# Patient Record
Sex: MALE | HISPANIC OR LATINO | Employment: FULL TIME | URBAN - METROPOLITAN AREA
[De-identification: names, ages, dates, MRNs, and addresses within clinical notes are randomized per-mention and may not be internally consistent; named-entity substitution may affect disease eponyms.]

---

## 2022-05-13 ENCOUNTER — EVALUATION (OUTPATIENT)
Dept: PHYSICAL THERAPY | Facility: CLINIC | Age: 37
End: 2022-05-13
Payer: COMMERCIAL

## 2022-05-13 DIAGNOSIS — M23.41 LOOSE BODY IN KNEE, RIGHT KNEE: Primary | ICD-10-CM

## 2022-05-13 DIAGNOSIS — M25.561 RIGHT KNEE PAIN, UNSPECIFIED CHRONICITY: ICD-10-CM

## 2022-05-13 PROCEDURE — 97161 PT EVAL LOW COMPLEX 20 MIN: CPT

## 2022-05-13 NOTE — PROGRESS NOTES
PT Evaluation     Today's date: 2022  Patient name: Rolando Leal  : 1985  MRN: [id number]  Referring provider: Che Mota  Dx:   Encounter Diagnosis     ICD-10-CM    1  Loose body in knee, right knee  M23 41    2  Right knee pain, unspecified chronicity  M25 561        Start Time: 1102  Stop Time: 1145  Total time in clinic (min): 43 minutes    Assessment  Assessment details: Pt is a pleasant 43 y o  male who presents to [place] [address] PT with one month s/p loose body removal in R knee  Today, he presents with decreased and painful R knee ROM and joint mobility, decreased B LE and core strength, high self reports of pain, and decreased tolerance to activity  Functionally, he is limited in his ability to stand and ambulate, negotiate stairs, perform functional transfers, and participate in age appropriate recreation and exercise  He ism motivated to improve  Pt will benefit from skilled PT to address the aforementioned deficits and limitations in an effort to maximize pain free functional mobility and overall quality of life  Progress as able with these goals in mind  Impairments: abnormal gait, abnormal muscle firing, abnormal muscle tone, abnormal or restricted ROM, abnormal movement, activity intolerance, impaired physical strength, lacks appropriate home exercise program and pain with function  Understanding of Dx/Px/POC: good   Prognosis: good    Goals  Short term goals (3 weeks):  1) Pt will improve R knee AROM to 0-125 pain free  2) Pt will improve B LE and core strength deficits by 1/3 grade MMT  3) Pt will reports pain at worse <4/10  4) Pt will initiate and progress HEP w/ special emphasis on functional knee ROM and quad control  Long term goals (6 weeks)  1) Pt will improve FOTO to at least 77   2) Pt will be functionally unlimited w/ community ambulation and stairs w/o deficit related to R knee    3) Pt will perform one full week of regular exercise w/ appropriate modifications and pain <2/10 in R knee  4) Pt will be independent and compliant w/ HEP in order to maximize functional benefit of skilled PT following d/c        Plan  Plan details: HEP to start: See scanned doc    Patient would benefit from: skilled PT  Planned modality interventions: cryotherapy and thermotherapy: hydrocollator packs  Planned therapy interventions: abdominal trunk stabilization, activity modification, joint mobilization, manual therapy, massage, motor coordination training, neuromuscular re-education, patient education, postural training, stretching, strengthening, therapeutic activities, therapeutic exercise, therapeutic training, transfer training, home exercise program, gait training, graded activity, functional ROM exercises, graded exercise, flexibility, body mechanics training, balance and balance/weight bearing training  Frequency: 2x week  Duration in visits: 12  Duration in weeks: 6  Treatment plan discussed with: patient        Subjective Evaluation    History of Present Illness  Date of surgery: 2022  Mechanism of injury: Pt has hx of R mensicus tear w/ resulting meniscectomy while in HS - had loose body removal (2), on 22  Still has some trouble sleeping, but notes that this is much better  Stairs, sit<>stand, bending knee are still very difficult  Feels that he is improving overall  Notes that one loose body was left in knee joint by surgeon  Main goal is to be able to walk, run, bend knee, and exercise w/o pain   Functional status below:   Quality of life: good    Pain  Current pain ratin  At best pain ratin  At worst pain ratin  Location: anterior and superior portion of R TF joint   Quality: sharp and radiating  Relieving factors: rest, ice and change in position  Aggravating factors: standing, walking, stair climbing, lifting and running  Progression: improved    Social Support  Steps to enter house: yes  Stairs in house: yes   Lives in: multiple-level home  Lives with: alone    Employment status: not working ([employer] work - would like to be back on 5/23)  Patient Goals  Patient goals for therapy: increased strength, increased motion, decreased pain and return to sport/leisure activities  Patient goal: be able to use R knee without pain! Objective     Palpation     Additional Palpation Details  Min TTP along distal quad on R     Neurological Testing     Sensation     Knee   Left Knee   Intact: light touch    Right Knee   Intact: light touch     Active Range of Motion   Left Knee   Normal active range of motion    Right Knee   Flexion: 100 degrees with pain  Extension: 2 degrees     Passive Range of Motion   Left Knee   Normal passive range of motion    Right Knee   Flexion: 108 degrees with pain  Extension: 1 degrees with pain    Strength/Myotome Testing     Left Knee   Flexion: 4+  Extension: 4+    Right Knee   Flexion: 3+  Extension: 3+    Additional Strength Details  R side hips grossly 4-/5    L side hips grossly 4/5     Core no greater than 2/5     Tests     Additional Tests Details  Deferred s/t knowledge of diagnosis       Ambulation     Ambulation: Level Surfaces     Additional Level Surfaces Ambulation Details  Decreased heel strike and toe off on R, not antalgic     General Comments:      Knee Comments  Sit<>stand: UE support on LE w/ L side WS     BW squat: not past 25% before L side trunk lean, min antalgic     Stairs: 6" w/ B UE support and significant cueing for proper WS     SLS: TBA      Flowsheet Rows    Flowsheet Row Most Recent Value   PT/OT G-Codes    Current Score 56   Projected Score 77   FOTO information reviewed Yes              Insurance:  AMA/CMS Eval/ Re-eval POC expires FOTO Auth Status Total   Visits  Start date  Expiration date Extension  Visit limitation? PT only or  PT+OT?  Co-Insurance              No                                                                 AUTH Status:  Date               Visits  Authed:  Used Remaining                      Precautions: standard       Date (Visit #) IE 5/13/22 (1) (2) (3) (4) (5)   Manual              DTM and TPR                                                                        Exercise Diary         Ther Ex        Active w/u             PROM  tested           HS/glute/piri stretch  x30 sec B HS            QS, SLR, hip abd  QS x10 on R, SAQ x10, SLR x 5            Active mobility         Heel slides self x 10 w/ strap                                       Neuro Re Ed        Bridging  x10-15 w/ ad sq           TrA progressions             Squat training  intro           Hip Abd Progressions             Balance          Step ups 4-6" x 20 total        HEP and POC review x 4-5 mins                                        Ther Act             stairs             gait             Sit<>stand                                                                                                                  Modalities             heat             Ice

## 2022-05-13 NOTE — LETTER
May 16, 2022    [address]    Patient: Gustavo Troy   YOB: 1985   Date of Visit: 2022     Encounter Diagnosis     ICD-10-CM    1  Loose body in knee, right knee  M23 41    2  Right knee pain, unspecified chronicity  M25 561        Dear Dr Beulah Nolasco: Thank you for your recent referral of Gustavo Troy  Please review the attached evaluation summary from Edward's recent visit  Please verify that you agree with the plan of care by signing the attached order  If you have any questions or concerns, please do not hesitate to call  I sincerely appreciate the opportunity to share in the care of one of your patients and hope to have another opportunity to work with you in the near future  Sincerely,    Zenaida Barron, PT      Referring Provider:      I certify that I have read the below Plan of Care and certify the need for these services furnished under this plan of treatment while under my care  [address]   [address]          PT Evaluation     Today's date: 2022  Patient name: Gustavo Troy  : 1985  MRN: [id number]  Referring provider: Crystal Ahn  Dx:   Encounter Diagnosis     ICD-10-CM    1  Loose body in knee, right knee  M23 41    2  Right knee pain, unspecified chronicity  M25 561        Start Time: 1102  Stop Time: 1145  Total time in clinic (min): 43 minutes    Assessment  Assessment details: Pt is a pleasant 43 y o  male who presents to [address] with one month s/p loose body removal in R knee  Today, he presents with decreased and painful R knee ROM and joint mobility, decreased B LE and core strength, high self reports of pain, and decreased tolerance to activity  Functionally, he is limited in his ability to stand and ambulate, negotiate stairs, perform functional transfers, and participate in age appropriate recreation and exercise   He ism motivated to improve  Pt will benefit from skilled PT to address the aforementioned deficits and limitations in an effort to maximize pain free functional mobility and overall quality of life  Progress as able with these goals in mind  Impairments: abnormal gait, abnormal muscle firing, abnormal muscle tone, abnormal or restricted ROM, abnormal movement, activity intolerance, impaired physical strength, lacks appropriate home exercise program and pain with function  Understanding of Dx/Px/POC: good   Prognosis: good    Goals  Short term goals (3 weeks):  1) Pt will improve R knee AROM to 0-125 pain free  2) Pt will improve B LE and core strength deficits by 1/3 grade MMT  3) Pt will reports pain at worse <4/10  4) Pt will initiate and progress HEP w/ special emphasis on functional knee ROM and quad control  Long term goals (6 weeks)  1) Pt will improve FOTO to at least 77   2) Pt will be functionally unlimited w/ community ambulation and stairs w/o deficit related to R knee  3) Pt will perform one full week of regular exercise w/ appropriate modifications and pain <2/10 in R knee     4) Pt will be independent and compliant w/ HEP in order to maximize functional benefit of skilled PT following d/c        Plan  Plan details: HEP to start: See scanned doc    Patient would benefit from: skilled PT  Planned modality interventions: cryotherapy and thermotherapy: hydrocollator packs  Planned therapy interventions: abdominal trunk stabilization, activity modification, joint mobilization, manual therapy, massage, motor coordination training, neuromuscular re-education, patient education, postural training, stretching, strengthening, therapeutic activities, therapeutic exercise, therapeutic training, transfer training, home exercise program, gait training, graded activity, functional ROM exercises, graded exercise, flexibility, body mechanics training, balance and balance/weight bearing training  Frequency: 2x week  Duration in visits: 12  Duration in weeks: 6  Treatment plan discussed with: patient        Subjective Evaluation    History of Present Illness  Date of surgery: 2022  Mechanism of injury: Pt has hx of R mensicus tear w/ resulting meniscectomy while in HS - had loose body removal (2), on 22  Still has some trouble sleeping, but notes that this is much better  Stairs, sit<>stand, bending knee are still very difficult  Feels that he is improving overall  Notes that one loose body was left in knee joint by surgeon  Main goal is to be able to walk, run, bend knee, and exercise w/o pain  Functional status below:   Quality of life: good    Pain  Current pain ratin  At best pain ratin  At worst pain ratin  Location: anterior and superior portion of R TF joint   Quality: sharp and radiating  Relieving factors: rest, ice and change in position  Aggravating factors: standing, walking, stair climbing, lifting and running  Progression: improved    Social Support  Steps to enter house: yes  Stairs in house: yes   Lives in: multiple-level home  Lives with: alone    Employment status: not working ([employer] work - would like to be back on )  Patient Goals  Patient goals for therapy: increased strength, increased motion, decreased pain and return to sport/leisure activities  Patient goal: be able to use R knee without pain!         Objective     Palpation     Additional Palpation Details  Min TTP along distal quad on R     Neurological Testing     Sensation     Knee   Left Knee   Intact: light touch    Right Knee   Intact: light touch     Active Range of Motion   Left Knee   Normal active range of motion    Right Knee   Flexion: 100 degrees with pain  Extension: 2 degrees     Passive Range of Motion   Left Knee   Normal passive range of motion    Right Knee   Flexion: 108 degrees with pain  Extension: 1 degrees with pain    Strength/Myotome Testing     Left Knee   Flexion: 4+  Extension: 4+    Right Knee Flexion: 3+  Extension: 3+    Additional Strength Details  R side hips grossly 4-/5    L side hips grossly 4/5     Core no greater than 2/5     Tests     Additional Tests Details  Deferred s/t knowledge of diagnosis       Ambulation     Ambulation: Level Surfaces     Additional Level Surfaces Ambulation Details  Decreased heel strike and toe off on R, not antalgic     General Comments:      Knee Comments  Sit<>stand: UE support on LE w/ L side WS     BW squat: not past 25% before L side trunk lean, min antalgic     Stairs: 6" w/ B UE support and significant cueing for proper WS     SLS: TBA      Flowsheet Rows    Flowsheet Row Most Recent Value   PT/OT G-Codes    Current Score 56   Projected Score 77   FOTO information reviewed Yes              Insurance:  AMA/CMS Eval/ Re-eval POC expires FOTO Auth Status Total   Visits  Start date  Expiration date Extension  Visit limitation? PT only or  PT+OT?  Co-Insurance              No                                                                 AUTH Status:  Date               Visits  Authed:  Used                Remaining                      Precautions: standard       Date (Visit #) IE 5/13/22 (1) (2) (3) (4) (5)   Manual              DTM and TPR                                                                        Exercise Diary         Ther Ex        Active w/u             PROM  tested           HS/glute/piri stretch  x30 sec B HS            QS, SLR, hip abd  QS x10 on R, SAQ x10, SLR x 5            Active mobility         Heel slides self x 10 w/ strap                                       Neuro Re Ed        Bridging  x10-15 w/ ad sq           TrA progressions             Squat training  intro           Hip Abd Progressions             Balance          Step ups 4-6" x 20 total        HEP and POC review x 4-5 mins                                        Ther Act             stairs             gait             Sit<>stand Modalities             heat             Ice

## 2022-05-18 ENCOUNTER — OFFICE VISIT (OUTPATIENT)
Dept: PHYSICAL THERAPY | Facility: CLINIC | Age: 37
End: 2022-05-18
Payer: COMMERCIAL

## 2022-05-18 DIAGNOSIS — M25.561 RIGHT KNEE PAIN, UNSPECIFIED CHRONICITY: ICD-10-CM

## 2022-05-18 DIAGNOSIS — M23.41 LOOSE BODY IN KNEE, RIGHT KNEE: Primary | ICD-10-CM

## 2022-05-18 PROCEDURE — 97110 THERAPEUTIC EXERCISES: CPT

## 2022-05-18 PROCEDURE — 97112 NEUROMUSCULAR REEDUCATION: CPT

## 2022-05-18 NOTE — PROGRESS NOTES
Daily Note     Today's date: 2022  Patient name: Mary Zepeda  : 1985  MRN: [id number]  Referring provider: Reshma Reeves  Dx:   Encounter Diagnosis     ICD-10-CM    1  Loose body in knee, right knee  M23 41    2  Right knee pain, unspecified chronicity  M25 561                   Subjective: Pt reports no new complaints, feels that exercises are helpful  SLR are most difficult  Objective: requires cueing for proper quad contraction during TKE work, corrects and is able to perform TKE  SLR and SAQ painful throughout - deferred       Assessment: Tolerated treatment well  Patient demonstrated fatigue post treatment and would benefit from continued PT  Fatigue but no increase in pain by end of session  Does well w/ use of ktape for patella support, shows improving control over quad in supine post tape  Will increase intensity as sx allow at next visit  Black and green tband given for home  Plan: Continue per plan of care  SAQ, leg press, stairs, squat w/ TKE        Insurance:  AMA/CMS Eval/ Re-eval POC expires FOTO Auth Status Total   Visits  Start date  Expiration date Extension  Visit limitation? PT only or  PT+OT?  Co-Insurance              No                                                                 AUTH Status:  Date               Visits  Authed:  Used                Remaining                      Precautions: standard       Date (Visit #) IE 22 (1)  (2) (3) (4) (5)   Manual              DTM and TPR                                                                        Exercise Diary         Ther Ex        Active w/u   10 min recumbent pre lvl 2-3         PROM  tested  x3-4 mins          HS/glute/piri stretch  x30 sec B HS   x2-3 mins B HS          QS, SLR, hip abd  QS x10 on R, SAQ x10, SLR x 5  QS x 20, SLR attempted, SAQ x 10         Active mobility         Heel slides self x 10 w/ strap rev        Black tband TKE x 20                              Neuro Re Ed Bridging  x10-15 w/ ad sq  x20 total         TrA progressions             Squat training  intro  x20 total reg         Hip Abd Progressions   Yellow tband 20'x6-8 total          Balance          Step ups 4-6" x 20 total rev       HEP and POC review x 4-5 mins  x2-3 mins                                       Ther Act             stairs             gait             Sit<>stand                                                                                                                  Modalities             heat             Ice

## 2022-05-20 ENCOUNTER — OFFICE VISIT (OUTPATIENT)
Dept: PHYSICAL THERAPY | Facility: CLINIC | Age: 37
End: 2022-05-20
Payer: COMMERCIAL

## 2022-05-20 DIAGNOSIS — M25.561 RIGHT KNEE PAIN, UNSPECIFIED CHRONICITY: ICD-10-CM

## 2022-05-20 DIAGNOSIS — M23.41 LOOSE BODY IN KNEE, RIGHT KNEE: Primary | ICD-10-CM

## 2022-05-20 PROCEDURE — 97112 NEUROMUSCULAR REEDUCATION: CPT

## 2022-05-20 PROCEDURE — 97110 THERAPEUTIC EXERCISES: CPT

## 2022-05-20 NOTE — PROGRESS NOTES
Daily Note      Today's date: 2022  Patient name: Margarita Bamberger  : 1985  MRN: [id number]  Referring provider: Mayito Oseguera  Dx:   Encounter Diagnosis     ICD-10-CM    1  Loose body in knee, right knee  M23 41    2  Right knee pain, unspecified chronicity  M25 561      Update 22: Pt chart to be formally d/c  He had called to say that he had gone back to work and did not have time for PT  He was making good progress at time of last visit  Should he require more help, a new case will be opened  Goals  Short term goals (3 weeks):  1) Pt will improve R knee AROM to 0-125 pain free  2) Pt will improve B LE and core strength deficits by 1/3 grade MMT  3) Pt will reports pain at worse <4/10  4) Pt will initiate and progress HEP w/ special emphasis on functional knee ROM and quad control  Long term goals (6 weeks)  1) Pt will improve FOTO to at least 77   2) Pt will be functionally unlimited w/ community ambulation and stairs w/o deficit related to R knee  3) Pt will perform one full week of regular exercise w/ appropriate modifications and pain <2/10 in R knee  4) Pt will be independent and compliant w/ HEP in order to maximize functional benefit of skilled PT following d/c  ALL GOALS PARTIALLY ACHIEVED AT TIME OF D/C     Subjective: Pt reports that his knee is getting better  Pt reports 0/10 pain at this time and states the exercises have been helping  Objective: requires cueing for control and pacing w/ 4" ant toe tap, corrects and is able to maintain  Assessment: Pt tolerated treatment well  Pt would benefit from continued PT   and Pt demonstrated good form with therex today  Does well w/ progressions to functional strength program  Will benefit from more aggressive WB challenges as able  Squat, lunge, stair activities will be highlighted moving forward  Plan: Continue per plan of care   squat progressions, leg press, lunge, TKE work, sled work Insurance:  AMA/CMS Eval/ Re-eval POC expires FOTO Auth Status Total   Visits  Start date  Expiration date Extension  Visit limitation? PT only or  PT+OT? Co-Insurance              No                                                                 AUTH Status:  Date               Visits  Authed:  Used                Remaining                      Precautions: standard; s/p R knee loose body removal 4/18/22      Date (Visit #) IE 5/13/22 (1) 5/18 (2) 5/20 (3) (4) (5)   Manual              DTM and TPR                                                                        Exercise Diary         Ther Ex        Active w/u   10 min recumbent pre lvl 2-3  10' rec bike lvl 2-3        PROM  tested  x3-4 mins   x3 min       HS/glute/piri stretch  x30 sec B HS   x2-3 mins B HS   x2 min B/L HS       QS, SLR, hip abd  QS x10 on R, SAQ x10, SLR x 5  QS x 20, SLR attempted, SAQ x 10 QS 2x10, SAQ 2x10        Active mobility         Heel slides self x 10 w/ strap rev        Black tband TKE x 20 Black TKE x 20                              Neuro Re Ed        Bridging  x10-15 w/ ad sq  x20 total  w/ ad sq x 30        TrA progressions             Squat training  intro  x20 total reg  w/ black tband x 20 total       Hip Abd Progressions   Yellow tband 20'x6-8 total   rev       Balance    4" ant toe taps x 20 total      Step ups 4-6" x 20 total rev rev      HEP and POC review x 4-5 mins  x2-3 mins  Rev and update x 2-3 mins        ktape crossing c's to R knee x 2-3 mins ktape crossing c's to R knee x 2-3 mins        CC retro walk 22  5# x1                     Ther Act             stairs             gait             Sit<>stand                                                                                                                  Modalities             heat             Ice

## 2022-05-23 ENCOUNTER — APPOINTMENT (OUTPATIENT)
Dept: PHYSICAL THERAPY | Facility: CLINIC | Age: 37
End: 2022-05-23
Payer: COMMERCIAL

## 2022-05-26 ENCOUNTER — APPOINTMENT (OUTPATIENT)
Dept: PHYSICAL THERAPY | Facility: CLINIC | Age: 37
End: 2022-05-26
Payer: COMMERCIAL

## 2024-05-29 ENCOUNTER — HOSPITAL ENCOUNTER (EMERGENCY)
Facility: HOSPITAL | Age: 39
Discharge: HOME | End: 2024-05-29
Attending: EMERGENCY MEDICINE
Payer: COMMERCIAL

## 2024-05-29 ENCOUNTER — APPOINTMENT (EMERGENCY)
Dept: RADIOLOGY | Facility: HOSPITAL | Age: 39
End: 2024-05-29
Payer: COMMERCIAL

## 2024-05-29 VITALS
DIASTOLIC BLOOD PRESSURE: 66 MMHG | WEIGHT: 202 LBS | BODY MASS INDEX: 28.28 KG/M2 | HEIGHT: 71 IN | HEART RATE: 85 BPM | OXYGEN SATURATION: 99 % | TEMPERATURE: 98 F | SYSTOLIC BLOOD PRESSURE: 124 MMHG | RESPIRATION RATE: 20 BRPM

## 2024-05-29 DIAGNOSIS — N30.01 ACUTE HEMORRHAGIC CYSTITIS: Primary | ICD-10-CM

## 2024-05-29 LAB

## 2024-05-29 PROCEDURE — 74176 CT ABD & PELVIS W/O CONTRAST: CPT

## 2024-05-29 PROCEDURE — 99284 EMERGENCY DEPT VISIT MOD MDM: CPT | Performed by: PHYSICIAN ASSISTANT

## 2024-05-29 PROCEDURE — 87077 CULTURE AEROBIC IDENTIFY: CPT | Performed by: EMERGENCY MEDICINE

## 2024-05-29 PROCEDURE — 99284 EMERGENCY DEPT VISIT MOD MDM: CPT

## 2024-05-29 PROCEDURE — 87086 URINE CULTURE/COLONY COUNT: CPT | Performed by: EMERGENCY MEDICINE

## 2024-05-29 PROCEDURE — 81001 URINALYSIS AUTO W/SCOPE: CPT | Performed by: EMERGENCY MEDICINE

## 2024-05-29 PROCEDURE — 87186 SC STD MICRODIL/AGAR DIL: CPT | Performed by: EMERGENCY MEDICINE

## 2024-05-29 RX ORDER — PHENAZOPYRIDINE HYDROCHLORIDE 200 MG/1
200 TABLET, FILM COATED ORAL 3 TIMES DAILY PRN
Qty: 6 TABLET | Refills: 0 | Status: SHIPPED | OUTPATIENT
Start: 2024-05-29 | End: 2024-05-31

## 2024-05-29 RX ORDER — AMOXICILLIN AND CLAVULANATE POTASSIUM 875; 125 MG/1; MG/1
1 TABLET, FILM COATED ORAL EVERY 12 HOURS
Qty: 14 TABLET | Refills: 0 | Status: SHIPPED | OUTPATIENT
Start: 2024-05-29 | End: 2024-06-05

## 2024-05-29 RX ORDER — PHENAZOPYRIDINE HYDROCHLORIDE 100 MG/1
100 TABLET, FILM COATED ORAL ONCE
Status: COMPLETED | OUTPATIENT
Start: 2024-05-29 | End: 2024-05-29

## 2024-05-29 RX ORDER — AMOXICILLIN AND CLAVULANATE POTASSIUM 875; 125 MG/1; MG/1
1 TABLET, FILM COATED ORAL ONCE
Status: COMPLETED | OUTPATIENT
Start: 2024-05-29 | End: 2024-05-29

## 2024-05-29 RX ADMIN — PHENAZOPYRIDINE 100 MG: 100 TABLET ORAL at 10:27

## 2024-05-29 RX ADMIN — AMOXICILLIN AND CLAVULANATE POTASSIUM 1 TABLET: 875; 125 TABLET, FILM COATED ORAL at 10:27

## 2024-05-29 NOTE — Clinical Note
Salvador Leung was seen and treated in our emergency department on 5/29/2024.                Diagnosis:     Salvador  may return to work on return date.    He may return on this date: 05/30/2024         If you have any questions or concerns, please don't hesitate to call.      Chelly Honeycutt PA-C    ______________________________           _______________          _______________  Hospital Representative                              Date                                Time

## 2024-05-29 NOTE — ED PROVIDER NOTES
"History  Chief Complaint   Patient presents with    Blood in Urine     Pt reports of blood in the urine with dribbling for 3 days      Patient is a 38-year-old male with no significant past medical history who presents for evaluation of hematuria that started this morning.  Patient reports that he has had increased urinary frequency and discomfort with urination over the past 3 days but this morning noted that he had blood in his urine.  He describes his urine color as \"fruit punch\" and does endorse small flecks of blood  in the bowl.  He endorses bladder spasm at the end of urination that is happening every time he urinates.  He denies any obstructive symptoms and has not noted any letha clots.  He has no history of smoking.  He denies new sexual contacts or STD exposure.  He denies fever, chills, nausea, vomiting, back pain or abdominal pain.      Blood in Urine  Pertinent negatives include no abdominal pain, chills, dysuria, fever or vomiting.       None       No past medical history on file.    No past surgical history on file.    No family history on file.  I have reviewed and agree with the history as documented.    E-Cigarette/Vaping     E-Cigarette/Vaping Substances          Review of Systems   Constitutional: Negative.  Negative for chills and fever.   HENT: Negative.  Negative for ear pain and sore throat.    Eyes: Negative.  Negative for pain and visual disturbance.   Respiratory: Negative.  Negative for cough and shortness of breath.    Cardiovascular: Negative.  Negative for chest pain and palpitations.   Gastrointestinal: Negative.  Negative for abdominal pain and vomiting.   Endocrine: Negative.    Genitourinary:  Positive for hematuria. Negative for dysuria.   Musculoskeletal: Negative.  Negative for arthralgias and back pain.   Skin: Negative.  Negative for color change and rash.   Allergic/Immunologic: Negative.    Neurological: Negative.  Negative for seizures and syncope.   Hematological: " Negative.    Psychiatric/Behavioral: Negative.     All other systems reviewed and are negative.      Physical Exam  Physical Exam  Vitals and nursing note reviewed.   Constitutional:       General: He is not in acute distress.     Appearance: Normal appearance. He is well-developed. He is not ill-appearing.   HENT:      Head: Normocephalic and atraumatic.      Right Ear: External ear normal.      Left Ear: External ear normal.      Nose: Nose normal.      Mouth/Throat:      Mouth: Mucous membranes are moist.   Eyes:      General: No scleral icterus.     Conjunctiva/sclera: Conjunctivae normal.   Cardiovascular:      Rate and Rhythm: Normal rate and regular rhythm.      Pulses: Normal pulses.      Heart sounds: Normal heart sounds. No murmur heard.  Pulmonary:      Effort: Pulmonary effort is normal. No respiratory distress.      Breath sounds: Normal breath sounds.   Abdominal:      General: Abdomen is flat. Bowel sounds are normal.      Palpations: Abdomen is soft.      Tenderness: There is no abdominal tenderness. There is no right CVA tenderness or left CVA tenderness.   Musculoskeletal:         General: No swelling. Normal range of motion.      Cervical back: Normal range of motion and neck supple.      Right lower leg: No edema.      Left lower leg: No edema.   Skin:     General: Skin is warm and dry.      Capillary Refill: Capillary refill takes less than 2 seconds.   Neurological:      General: No focal deficit present.      Mental Status: He is alert and oriented to person, place, and time.   Psychiatric:         Mood and Affect: Mood normal.         Behavior: Behavior normal.         Vital Signs  ED Triage Vitals   Temperature Pulse Respirations Blood Pressure SpO2   05/29/24 0901 05/29/24 0901 05/29/24 0901 05/29/24 0901 05/29/24 0901   98.2 °F (36.8 °C) 87 20 114/68 98 %      Temp Source Heart Rate Source Patient Position - Orthostatic VS BP Location FiO2 (%)   05/29/24 1029 05/29/24 1029 05/29/24 1029  05/29/24 1029 --   Tympanic Monitor Sitting Left arm       Pain Score       --                  Vitals:    05/29/24 0901 05/29/24 1029   BP: 114/68 124/66   Pulse: 87 85   Patient Position - Orthostatic VS:  Sitting         Visual Acuity      ED Medications  Medications   amoxicillin-clavulanate (AUGMENTIN) 875-125 mg per tablet 1 tablet (1 tablet Oral Given 5/29/24 1027)   phenazopyridine (PYRIDIUM) tablet 100 mg (100 mg Oral Given 5/29/24 1027)       Diagnostic Studies  Results Reviewed       Procedure Component Value Units Date/Time    Urine Microscopic [[id number]]  (Abnormal) Collected: 05/29/24 0911    Lab Status: Final result Specimen: Urine, Clean Catch Updated: 05/29/24 1008     RBC, UA Innumerable /hpf      WBC, UA 30-50 /hpf      Epithelial Cells Occasional /hpf      Bacteria, UA Moderate /hpf     Urine culture [[id number]] Collected: 05/29/24 0911    Lab Status: In process Specimen: Urine, Clean Catch Updated: 05/29/24 1007    UA w Reflex to Microscopic w Reflex to Culture [[id number]]  (Abnormal) Collected: 05/29/24 0911    Lab Status: Final result Specimen: Urine, Clean Catch Updated: 05/29/24 0923     Color, UA Yellow     Clarity, UA Cloudy     Specific Gravity, UA 1.025     pH, UA 6.0     Leukocytes, UA Large     Nitrite, UA Negative     Protein, UA 30 (1+) mg/dl      Glucose, UA Negative mg/dl      Ketones, UA Negative mg/dl      Urobilinogen, UA <2.0 mg/dl      Bilirubin, UA Negative     Occult Blood, UA Large                   CT renal stone study abdomen pelvis wo contrast   Final Result by Mahesh Tello MD (05/29 0954)      No CT evidence of nephrolithiasis or obstructive uropathy.      Circumferential bladder wall thickening with surrounding inflammatory stranding consistent with cystitis.               Workstation performed: [id number]                    Procedures  Procedures         ED Course                               SBIRT 22yo+      Flowsheet Row Most Recent Value   Initial Alcohol  Screen: US AUDIT-C     1. How often do you have a drink containing alcohol? 1 Filed at: 05/29/2024 0911   2. How many drinks containing alcohol do you have on a typical day you are drinking?  0 Filed at: 05/29/2024 0911   3a. Male UNDER 65: How often do you have five or more drinks on one occasion? 0 Filed at: 05/29/2024 0911   Audit-C Score 1 Filed at: 05/29/2024 0911   CATHERINE: How many times in the past year have you...    Used an illegal drug or used a prescription medication for non-medical reasons? Never Filed at: 05/29/2024 0911                      Medical Decision Making  Problems Addressed:  Acute hemorrhagic cystitis: acute illness or injury    Amount and/or Complexity of Data Reviewed  Labs: ordered.  Radiology: ordered.    Risk  Prescription drug management.             Disposition  Final diagnoses:   Acute hemorrhagic cystitis     Time reflects when diagnosis was documented in both MDM as applicable and the Disposition within this note       Time User Action Codes Description Comment    5/29/2024 10:13 AM Chelly Honeycutt Add [N30.01] Acute hemorrhagic cystitis           ED Disposition       ED Disposition   Discharge    Condition   Stable    Date/Time   Wed May 29, 2024 1013    Comment   Salvador Leung discharge to home/self care.                   Follow-up Information       Follow up With Specialties Details Why Contact Info Additional Information    Primary Care Provider  Schedule an appointment as soon as possible for a visit        [facility] Emergency Department Emergency Medicine Go to  If symptoms worsen [address] [place] [address]  [phone] [facility] Emergency Department, [address], [place], [address]            Discharge Medication List as of 5/29/2024 10:19 AM        START taking these medications    Details   amoxicillin-clavulanate (AUGMENTIN) 875-125 mg per tablet Take 1 tablet by mouth every 12 (twelve) hours  for 7 days, Starting Wed 5/29/2024, Until Wed 6/5/2024, Normal      phenazopyridine (PYRIDIUM) 200 mg tablet Take 1 tablet (200 mg total) by mouth 3 (three) times a day as needed for bladder spasms for up to 2 days, Starting Wed 5/29/2024, Until Fri 5/31/2024 at 2359, Normal             No discharge procedures on file.    PDMP Review       None            ED Provider  Electronically Signed by             Chelly Honeycutt PA-C  05/29/24 1400

## 2024-06-01 LAB — BACTERIA UR CULT: ABNORMAL

## 2024-07-31 ENCOUNTER — HOSPITAL ENCOUNTER (EMERGENCY)
Facility: HOSPITAL | Age: 39
Discharge: HOME | End: 2024-07-31
Attending: EMERGENCY MEDICINE
Payer: COMMERCIAL

## 2024-07-31 ENCOUNTER — TELEPHONE (OUTPATIENT)
Age: 39
End: 2024-07-31

## 2024-07-31 VITALS
TEMPERATURE: 98.9 F | HEART RATE: 86 BPM | RESPIRATION RATE: 18 BRPM | WEIGHT: 200 LBS | HEIGHT: 71 IN | SYSTOLIC BLOOD PRESSURE: 106 MMHG | DIASTOLIC BLOOD PRESSURE: 58 MMHG | BODY MASS INDEX: 28 KG/M2 | OXYGEN SATURATION: 97 %

## 2024-07-31 DIAGNOSIS — N39.0 UTI (URINARY TRACT INFECTION): Primary | ICD-10-CM

## 2024-07-31 LAB
BACTERIA UR QL AUTO: ABNORMAL /HPF
BILIRUB UR QL STRIP: NEGATIVE
CLARITY UR: ABNORMAL
COLOR UR: YELLOW
GLUCOSE UR STRIP-MCNC: NEGATIVE MG/DL
HGB UR QL STRIP.AUTO: ABNORMAL
KETONES UR STRIP-MCNC: ABNORMAL MG/DL
LEUKOCYTE ESTERASE UR QL STRIP: ABNORMAL
NITRITE UR QL STRIP: NEGATIVE
NON-SQ EPI CELLS URNS QL MICRO: ABNORMAL /HPF
PH UR STRIP.AUTO: 6 [PH]
PROT UR STRIP-MCNC: ABNORMAL MG/DL
RBC #/AREA URNS AUTO: ABNORMAL /HPF
SP GR UR STRIP.AUTO: >=1.03 (ref 1–1.03)
UROBILINOGEN UR STRIP-ACNC: 2 MG/DL
WBC #/AREA URNS AUTO: ABNORMAL /HPF

## 2024-07-31 PROCEDURE — 87086 URINE CULTURE/COLONY COUNT: CPT | Performed by: EMERGENCY MEDICINE

## 2024-07-31 PROCEDURE — 87077 CULTURE AEROBIC IDENTIFY: CPT | Performed by: EMERGENCY MEDICINE

## 2024-07-31 PROCEDURE — 87186 SC STD MICRODIL/AGAR DIL: CPT | Performed by: EMERGENCY MEDICINE

## 2024-07-31 PROCEDURE — 99283 EMERGENCY DEPT VISIT LOW MDM: CPT

## 2024-07-31 PROCEDURE — 99284 EMERGENCY DEPT VISIT MOD MDM: CPT | Performed by: EMERGENCY MEDICINE

## 2024-07-31 PROCEDURE — 81001 URINALYSIS AUTO W/SCOPE: CPT | Performed by: EMERGENCY MEDICINE

## 2024-07-31 RX ORDER — AMOXICILLIN AND CLAVULANATE POTASSIUM 875; 125 MG/1; MG/1
1 TABLET, FILM COATED ORAL EVERY 12 HOURS SCHEDULED
Qty: 14 TABLET | Refills: 0 | Status: SHIPPED | OUTPATIENT
Start: 2024-07-31 | End: 2024-08-07

## 2024-07-31 NOTE — TELEPHONE ENCOUNTER
New Patient    What is the reason for the patient's appointment?: UTI (urinary tract infection)     What office location does the patient prefer?: [place] or [place]    Does patient have Imaging/Lab Results:   ER 7/31/24    Have patient records been requested?:  If No, are the records showing in Epic: Yes      INSURANCE:   Do we accept the patient's insurance or is the patient Self-Pay?: Yes    Insurance Provider: [employer]   Plan Type/Number:   Member ID#: [id number]       HISTORY:   Has the patient had any previous Urologist(s)?: No    Was the patient seen in the ED?:  ER 7/31/24    Has the patient had any outside testing done?:  ER 7/31/24    Does the patient have a personal history of cancer?: N/A    Pt scheduled for the next available of 9/13/24 and added to the wait list.

## 2024-07-31 NOTE — ED PROVIDER NOTES
History  Chief Complaint   Patient presents with    Blood in Urine     Pt reports having hx UTI, and waking up this morning to blood in urine and burning pain upon urination.       38 yo male c/o hematuria that he noted this morning.  Felt a little chills but no documented fever.  No nausea, vomiting, diarrhea.  No back pain.  He was treated for hemorrhagic cystitis 2 months ago.  He got better from that treatment, he did not follow up with anyone.      History provided by:  Patient   used: No    Blood in Urine  Associated symptoms include chills. Pertinent negatives include no vomiting.       None       History reviewed. No pertinent past medical history.    History reviewed. No pertinent surgical history.    History reviewed. No pertinent family history.  I have reviewed and agree with the history as documented.    E-Cigarette/Vaping    E-Cigarette Use Never User      E-Cigarette/Vaping Substances     Social History     Tobacco Use    Smoking status: Never    Smokeless tobacco: Never   Vaping Use    Vaping status: Never Used   Substance Use Topics    Alcohol use: Yes     Comment: socially    Drug use: Never       Review of Systems   Constitutional:  Positive for chills.   Gastrointestinal:  Negative for diarrhea and vomiting.   Genitourinary:  Positive for hematuria.   Musculoskeletal:  Negative for back pain.       Physical Exam  Physical Exam  Vitals and nursing note reviewed.   Constitutional:       General: He is not in acute distress.     Appearance: He is well-developed. He is not ill-appearing or diaphoretic.   HENT:      Head: Normocephalic and atraumatic.   Eyes:      General: No scleral icterus.     Conjunctiva/sclera: Conjunctivae normal.   Cardiovascular:      Rate and Rhythm: Normal rate and regular rhythm.      Heart sounds: Normal heart sounds. No murmur heard.  Pulmonary:      Effort: Pulmonary effort is normal. No respiratory distress.      Breath sounds: Normal breath sounds.    Abdominal:      General: Bowel sounds are normal. There is no distension.      Palpations: Abdomen is soft.      Tenderness: There is no abdominal tenderness. There is no right CVA tenderness or left CVA tenderness.   Musculoskeletal:         General: No deformity. Normal range of motion.      Cervical back: Normal range of motion and neck supple.      Right lower leg: No edema.      Left lower leg: No edema.   Skin:     General: Skin is warm and dry.      Coloration: Skin is not pale.      Findings: No erythema or rash.   Neurological:      General: No focal deficit present.      Mental Status: He is alert and oriented to person, place, and time.      Cranial Nerves: No cranial nerve deficit.   Psychiatric:         Mood and Affect: Mood normal.         Behavior: Behavior normal.         Vital Signs  ED Triage Vitals [07/31/24 0947]   Temperature Pulse Respirations Blood Pressure SpO2   98.9 °F (37.2 °C) 86 18 106/58 97 %      Temp Source Heart Rate Source Patient Position - Orthostatic VS BP Location FiO2 (%)   Tympanic Monitor Sitting Right arm --      Pain Score       No Pain           Vitals:    07/31/24 0947   BP: 106/58   Pulse: 86   Patient Position - Orthostatic VS: Sitting         Visual Acuity      ED Medications  Medications - No data to display    Diagnostic Studies  Results Reviewed       Procedure Component Value Units Date/Time    UA (URINE) with reflex to Scope [[id number]]     Lab Status: No result Specimen: Urine     Urine culture [[id number]]     Lab Status: No result Specimen: Urine                    No orders to display              Procedures  Procedures         ED Course                                               Medical Decision Making  Prior records reviewed.  CT scan done 2 months ago which showed cystitis but no hydro/stone.  Urine culture at that time grew out E. Coli, ESBL which was susceptible to augmentin and that's what he was treated with.  Will send culture again, treat with  augmentin pending results and ambulatory referral placed to urology.    Amount and/or Complexity of Data Reviewed  Labs: ordered.                 Disposition  Final diagnoses:   UTI (urinary tract infection)     Time reflects when diagnosis was documented in both MDM as applicable and the Disposition within this note       Time User Action Codes Description Comment    7/31/2024 10:59 AM Nelida Simmons Add [N39.0] UTI (urinary tract infection)           ED Disposition       ED Disposition   Discharge    Condition   Stable    Date/Time   Wed Jul 31, 2024 10:59 AM    Comment   Enrriquemariusz SteinLeung discharge to home/self care.                   Follow-up Information       Follow up With Specialties Details Why Contact Info    Ricki Bhatti MD Urology Schedule an appointment as soon as possible for a visit   [address] [place] [address]  [phone]              Patient's Medications   Discharge Prescriptions    AMOXICILLIN-CLAVULANATE (AUGMENTIN) 875-125 MG PER TABLET    Take 1 tablet by mouth every 12 (twelve) hours for 7 days       Start Date: 7/31/2024 End Date: 8/7/2024       Order Dose: 1 tablet       Quantity: 14 tablet    Refills: 0           PDMP Review       None            ED Provider  Electronically Signed by             Nelida Simmons MD  07/31/24 2969

## 2024-07-31 NOTE — DISCHARGE INSTRUCTIONS
Take the antibiotic as prescribed.  Keep hydrated with fluids.  Follow up with the urologist for further evaluation.  Return to ER if fever, intractable vomiting, severe pain.

## 2024-08-03 LAB
BACTERIA UR CULT: ABNORMAL
BACTERIA UR CULT: ABNORMAL

## 2024-08-12 ENCOUNTER — TELEPHONE (OUTPATIENT)
Dept: UROLOGY | Facility: CLINIC | Age: 39
End: 2024-08-12

## 2024-12-22 ENCOUNTER — HOSPITAL ENCOUNTER (EMERGENCY)
Facility: HOSPITAL | Age: 39
Discharge: HOME | End: 2024-12-22
Attending: EMERGENCY MEDICINE
Payer: COMMERCIAL

## 2024-12-22 ENCOUNTER — APPOINTMENT (EMERGENCY)
Dept: RADIOLOGY | Facility: HOSPITAL | Age: 39
End: 2024-12-22
Payer: COMMERCIAL

## 2024-12-22 VITALS
DIASTOLIC BLOOD PRESSURE: 53 MMHG | SYSTOLIC BLOOD PRESSURE: 98 MMHG | RESPIRATION RATE: 20 BRPM | WEIGHT: 215 LBS | HEART RATE: 99 BPM | BODY MASS INDEX: 29.99 KG/M2 | OXYGEN SATURATION: 98 % | TEMPERATURE: 99.1 F

## 2024-12-22 DIAGNOSIS — R10.9 ABDOMINAL PAIN: Primary | ICD-10-CM

## 2024-12-22 DIAGNOSIS — K52.9 ENTERITIS: ICD-10-CM

## 2024-12-22 DIAGNOSIS — R19.7 NAUSEA VOMITING AND DIARRHEA: ICD-10-CM

## 2024-12-22 DIAGNOSIS — R79.89 ELEVATED SERUM CREATININE: ICD-10-CM

## 2024-12-22 DIAGNOSIS — R11.2 NAUSEA VOMITING AND DIARRHEA: ICD-10-CM

## 2024-12-22 LAB
ALBUMIN SERPL BCG-MCNC: 5.1 G/DL (ref 3.5–5)
ALP SERPL-CCNC: 62 U/L (ref 34–104)
ALT SERPL W P-5'-P-CCNC: 57 U/L (ref 7–52)
ANION GAP SERPL CALCULATED.3IONS-SCNC: 11 MMOL/L (ref 4–13)
AST SERPL W P-5'-P-CCNC: 39 U/L (ref 13–39)
BASOPHILS # BLD AUTO: 0.02 THOUSANDS/ÂΜL (ref 0–0.1)
BASOPHILS NFR BLD AUTO: 0 % (ref 0–1)
BILIRUB SERPL-MCNC: 1.18 MG/DL (ref 0.2–1)
BUN SERPL-MCNC: 28 MG/DL (ref 5–25)
CALCIUM SERPL-MCNC: 10 MG/DL (ref 8.4–10.2)
CHLORIDE SERPL-SCNC: 103 MMOL/L (ref 96–108)
CO2 SERPL-SCNC: 24 MMOL/L (ref 21–32)
CREAT SERPL-MCNC: 2.41 MG/DL (ref 0.6–1.3)
EOSINOPHIL # BLD AUTO: 0 THOUSAND/ÂΜL (ref 0–0.61)
EOSINOPHIL NFR BLD AUTO: 0 % (ref 0–6)
ERYTHROCYTE [DISTWIDTH] IN BLOOD BY AUTOMATED COUNT: 12.6 % (ref 11.6–15.1)
GFR SERPL CREATININE-BSD FRML MDRD: 32 ML/MIN/1.73SQ M
GLUCOSE SERPL-MCNC: 131 MG/DL (ref 65–140)
HCT VFR BLD AUTO: 49.2 % (ref 36.5–49.3)
HGB BLD-MCNC: 16.4 G/DL (ref 12–17)
IMM GRANULOCYTES # BLD AUTO: 0.06 THOUSAND/UL (ref 0–0.2)
IMM GRANULOCYTES NFR BLD AUTO: 0 % (ref 0–2)
LIPASE SERPL-CCNC: 26 U/L (ref 11–82)
LYMPHOCYTES # BLD AUTO: 0.22 THOUSANDS/ÂΜL (ref 0.6–4.47)
LYMPHOCYTES NFR BLD AUTO: 1 % (ref 14–44)
MCH RBC QN AUTO: 30.6 PG (ref 26.8–34.3)
MCHC RBC AUTO-ENTMCNC: 33.3 G/DL (ref 31.4–37.4)
MCV RBC AUTO: 92 FL (ref 82–98)
MONOCYTES # BLD AUTO: 0.43 THOUSAND/ÂΜL (ref 0.17–1.22)
MONOCYTES NFR BLD AUTO: 3 % (ref 4–12)
NEUTROPHILS # BLD AUTO: 14.46 THOUSANDS/ÂΜL (ref 1.85–7.62)
NEUTS SEG NFR BLD AUTO: 96 % (ref 43–75)
NRBC BLD AUTO-RTO: 0 /100 WBCS
PLATELET # BLD AUTO: 308 THOUSANDS/UL (ref 149–390)
PLATELET BLD QL SMEAR: ADEQUATE
PMV BLD AUTO: 8.9 FL (ref 8.9–12.7)
POTASSIUM SERPL-SCNC: 4.7 MMOL/L (ref 3.5–5.3)
PROT SERPL-MCNC: 8.2 G/DL (ref 6.4–8.4)
RBC # BLD AUTO: 5.36 MILLION/UL (ref 3.88–5.62)
RBC MORPH BLD: NORMAL
SODIUM SERPL-SCNC: 138 MMOL/L (ref 135–147)
WBC # BLD AUTO: 15.19 THOUSAND/UL (ref 4.31–10.16)

## 2024-12-22 PROCEDURE — 99284 EMERGENCY DEPT VISIT MOD MDM: CPT

## 2024-12-22 PROCEDURE — 99285 EMERGENCY DEPT VISIT HI MDM: CPT | Performed by: EMERGENCY MEDICINE

## 2024-12-22 PROCEDURE — 96375 TX/PRO/DX INJ NEW DRUG ADDON: CPT

## 2024-12-22 PROCEDURE — 83690 ASSAY OF LIPASE: CPT | Performed by: EMERGENCY MEDICINE

## 2024-12-22 PROCEDURE — 36415 COLL VENOUS BLD VENIPUNCTURE: CPT | Performed by: EMERGENCY MEDICINE

## 2024-12-22 PROCEDURE — 96374 THER/PROPH/DIAG INJ IV PUSH: CPT

## 2024-12-22 PROCEDURE — 85025 COMPLETE CBC W/AUTO DIFF WBC: CPT | Performed by: EMERGENCY MEDICINE

## 2024-12-22 PROCEDURE — 80053 COMPREHEN METABOLIC PANEL: CPT | Performed by: EMERGENCY MEDICINE

## 2024-12-22 PROCEDURE — 96361 HYDRATE IV INFUSION ADD-ON: CPT

## 2024-12-22 PROCEDURE — 74177 CT ABD & PELVIS W/CONTRAST: CPT

## 2024-12-22 RX ORDER — KETOROLAC TROMETHAMINE 30 MG/ML
15 INJECTION, SOLUTION INTRAMUSCULAR; INTRAVENOUS ONCE
Status: COMPLETED | OUTPATIENT
Start: 2024-12-22 | End: 2024-12-22

## 2024-12-22 RX ORDER — ONDANSETRON 2 MG/ML
4 INJECTION INTRAMUSCULAR; INTRAVENOUS ONCE
Status: COMPLETED | OUTPATIENT
Start: 2024-12-22 | End: 2024-12-22

## 2024-12-22 RX ORDER — ONDANSETRON 4 MG/1
4 TABLET, ORALLY DISINTEGRATING ORAL EVERY 6 HOURS PRN
Qty: 20 TABLET | Refills: 0 | Status: SHIPPED | OUTPATIENT
Start: 2024-12-22

## 2024-12-22 RX ADMIN — SODIUM CHLORIDE 1000 ML: 0.9 INJECTION, SOLUTION INTRAVENOUS at 13:18

## 2024-12-22 RX ADMIN — KETOROLAC TROMETHAMINE 15 MG: 30 INJECTION, SOLUTION INTRAMUSCULAR; INTRAVENOUS at 13:18

## 2024-12-22 RX ADMIN — ONDANSETRON 4 MG: 2 INJECTION INTRAMUSCULAR; INTRAVENOUS at 13:18

## 2024-12-22 RX ADMIN — IOHEXOL 80 ML: 350 INJECTION, SOLUTION INTRAVENOUS at 14:00

## 2024-12-22 NOTE — DISCHARGE INSTRUCTIONS
Follow-up with primary care for further care. Keep upcoming appointment.  Use over the counter medications as stated on the bottle as needed for pain control.  Take your new medications as prescribed as needed for nausea.  Stay hydrated.  Use script provided to repeat bloodwork in 3-4 days.   Return to the ED with new or worsening symptoms.

## 2024-12-22 NOTE — ED PROVIDER NOTES
Time reflects when diagnosis was documented in both MDM as applicable and the Disposition within this note       Time User Action Codes Description Comment    12/22/2024  3:37 PM Christianne Gutierrez [R10.9] Abdominal pain     12/22/2024  3:37 PM Christianne Gutierrez Add [R11.2,  R19.7] Nausea vomiting and diarrhea     12/22/2024  3:38 PM Christianne Gutierrez [K52.9] Enteritis     12/22/2024  3:38 PM Christianne Gutierrez [R79.89] Elevated serum creatinine           ED Disposition       ED Disposition   Discharge    Condition   Stable    Date/Time   Sun Dec 22, 2024  3:51 PM    Comment   Salvador Leung discharge to home/self care.                   Assessment & Plan       Medical Decision Making  Pt is a 40yo M who presents with abdominal pain, vomiting, and diarrhea.     Differential diagnosis to include but not limited to pancreatitis, gastritis, gastroenteritis, dehydration, electrolyte abnormality, other intra-abdominal pathology.  Will plan for labs and imaging.  Will treat symptomatically and reassess.  See ED course for results and details.    Plan to discharge pt with f/u to PCP. Discussed returning the ED with new or worsening of symptoms. Discussed use of over the counter medications as stated on the bottle as needed for pain. Discussed taking new medication as prescribed as needed for nausea and vomiting. Discussed obtaining repeat bloodwork in 3-4 days. Pt expressed understanding of discharge instructions, return precautions, and medication instructions and is stable for discharge at this time. All questions were answered and pt was discharged without incident.       Amount and/or Complexity of Data Reviewed  Labs: ordered. Decision-making details documented in ED Course.  Radiology: ordered. Decision-making details documented in ED Course.    Risk  Prescription drug management.        ED Course as of 12/22/24 1615   Sun Dec 22, 2024   1320 WBC(!): 15.19  Elevated. Non-specific.    1320 CBC and  differential(!)  Reviewed and without actionable derangement.    1336 Creatinine(!): 2.41  Elevated. No prior available for comparison. Pt with no know kidney disease. Possibly 2/2 dehydration from acute illness. Fluids in process.    1337 Comprehensive metabolic panel(!)  Reviewed and without actionable derangement aside from elevated creatinine.    1337 LIPASE: 26  WNL   1506 Pt reassessed. He reports that he is feeling significantly better. Pt made aware of results thus far and that we are awaiting CT read.    1531 CT abdomen pelvis with contrast  Findings suggestive of mild enteritis. Diverticulosis without evidence of diverticulitis or colitis.    1535 Pt made aware of CT result and provided with juice for PO challenge. Will plan for DC with antiemetics and PCP follow-up if tolerates PO. Pt reports that he is scheduled to see PCP on 12/31.    1550 Pt tolerated PO without difficulty.        Medications   sodium chloride 0.9 % bolus 1,000 mL (0 mL Intravenous Stopped 12/22/24 1418)   ondansetron (ZOFRAN) injection 4 mg (4 mg Intravenous Given 12/22/24 1318)   ketorolac (TORADOL) injection 15 mg (15 mg Intravenous Given 12/22/24 1318)   iohexol (OMNIPAQUE) 350 MG/ML injection (MULTI-DOSE) 80 mL (80 mL Intravenous Given 12/22/24 1400)       ED Risk Strat Scores                          SBIRT 20yo+      Flowsheet Row Most Recent Value   Initial Alcohol Screen: US AUDIT-C     1. How often do you have a drink containing alcohol? 1 Filed at: 12/22/2024 1404   2. How many drinks containing alcohol do you have on a typical day you are drinking?  1 Filed at: 12/22/2024 1404   3a. Male UNDER 65: How often do you have five or more drinks on one occasion? 0 Filed at: 12/22/2024 1404   3b. FEMALE Any Age, or MALE 65+: How often do you have 4 or more drinks on one occassion? 0 Filed at: 12/22/2024 1404   Audit-C Score 2 Filed at: 12/22/2024 1404   CATHERINE: How many times in the past year have you...    Used an illegal drug or  used a prescription medication for non-medical reasons? Never Filed at: 12/22/2024 1409                            History of Present Illness       Chief Complaint   Patient presents with    Abdominal Pain     Nausea, vomiting, diarrhea and abd pain since yesterday afternoon, also has fever, unsure if it was from eating burgers yesterday, started two hours after this       History reviewed. No pertinent past medical history.   Past Surgical History:   Procedure Laterality Date    KNEE SURGERY Right       History reviewed. No pertinent family history.   Social History     Tobacco Use    Smoking status: Never    Smokeless tobacco: Never   Vaping Use    Vaping status: Never Used   Substance Use Topics    Alcohol use: Yes     Comment: socially    Drug use: Never      E-Cigarette/Vaping    E-Cigarette Use Never User       E-Cigarette/Vaping Substances      I have reviewed and agree with the history as documented.     Pt is a 38yo M who presents for abdominal pain, nausea, and vomiting.  Patient reports that yesterday he began with diffuse abdominal discomfort.  Patient reports since that time he has had approximately 7 episodes of vomiting and approximately 7 episodes of diarrhea.  Patient reports no blood in vomit or in diarrhea.  Patient reports subjective fevers and chills.  Patient states he did eat a burger approximately 2 hours prior to onset of symptoms.  Patient states no one else ate the same food as him.  Patient denies any recent sick contacts or anyone with similar symptoms.  Patient reports prior to this he was feeling well.            Objective       ED Triage Vitals   Temperature Pulse Blood Pressure Respirations SpO2 Patient Position - Orthostatic VS   12/22/24 1243 12/22/24 1243 12/22/24 1243 12/22/24 1243 12/22/24 1243 12/22/24 1243   99.1 °F (37.3 °C) 104 106/58 22 97 % Sitting      Temp Source Heart Rate Source BP Location FiO2 (%) Pain Score    12/22/24 1243 12/22/24 1315 12/22/24 1243 -- 12/22/24  1243    Tympanic Monitor Right arm  8      Vitals      Date and Time Temp Pulse SpO2 Resp BP Pain Score FACES Pain Rating User   12/22/24 1609 -- 99 98 % 20 98/53 -- -- WAQAR   12/22/24 1454 -- 89 97 % 20 108/53 No Pain -- CS   12/22/24 1330 -- 86 97 % 20 117/56 -- -- CS   12/22/24 1318 -- -- -- -- -- 7 -- CS   12/22/24 1315 -- 89 97 % 20 117/63 -- -- CS   12/22/24 1243 99.1 °F (37.3 °C) 104 97 % 22 106/58 8 -- LS            Physical Exam  Vitals reviewed.   Constitutional:       General: He is not in acute distress.     Appearance: He is well-developed. He is not toxic-appearing or diaphoretic.   HENT:      Head: Normocephalic and atraumatic.      Right Ear: External ear normal.      Left Ear: External ear normal.      Nose: Nose normal.      Mouth/Throat:      Pharynx: Oropharynx is clear.   Eyes:      Extraocular Movements: Extraocular movements intact.      Pupils: Pupils are equal, round, and reactive to light.   Cardiovascular:      Rate and Rhythm: Normal rate and regular rhythm.      Heart sounds: Normal heart sounds.   Pulmonary:      Effort: Pulmonary effort is normal. No respiratory distress.      Breath sounds: Normal breath sounds.   Abdominal:      General: Bowel sounds are normal. There is no distension.      Palpations: Abdomen is soft.      Tenderness: There is abdominal tenderness (diffuse). There is no rebound.   Musculoskeletal:         General: Normal range of motion.      Cervical back: Normal range of motion and neck supple.   Skin:     General: Skin is warm and dry.      Capillary Refill: Capillary refill takes less than 2 seconds.      Coloration: Skin is not pale.   Neurological:      General: No focal deficit present.      Mental Status: He is alert and oriented to person, place, and time.   Psychiatric:         Speech: Speech normal.         Behavior: Behavior is cooperative.         Results Reviewed       Procedure Component Value Units Date/Time    CBC and differential [[id number]]   (Abnormal) Collected: 12/22/24 1314    Lab Status: Final result Specimen: Blood from Arm, Left Updated: 12/22/24 1405     WBC 15.19 Thousand/uL      RBC 5.36 Million/uL      Hemoglobin 16.4 g/dL      Hematocrit 49.2 %      MCV 92 fL      MCH 30.6 pg      MCHC 33.3 g/dL      RDW 12.6 %      MPV 8.9 fL      Platelets 308 Thousands/uL      nRBC 0 /100 WBCs      Segmented % 96 %      Immature Grans % 0 %      Lymphocytes % 1 %      Monocytes % 3 %      Eosinophils Relative 0 %      Basophils Relative 0 %      Absolute Neutrophils 14.46 Thousands/µL      Absolute Immature Grans 0.06 Thousand/uL      Absolute Lymphocytes 0.22 Thousands/µL      Absolute Monocytes 0.43 Thousand/µL      Eosinophils Absolute 0.00 Thousand/µL      Basophils Absolute 0.02 Thousands/µL     Narrative:      This is an appended report.  These results have been appended to a previously verified report.    Smear Review(Phlebs Do Not Order) [[id number]] Collected: 12/22/24 1314    Lab Status: Final result Specimen: Blood from Arm, Left Updated: 12/22/24 1405     RBC Morphology Normal     Platelet Estimate Adequate    Comprehensive metabolic panel [[id number]]  (Abnormal) Collected: 12/22/24 1314    Lab Status: Final result Specimen: Blood from Arm, Left Updated: 12/22/24 1335     Sodium 138 mmol/L      Potassium 4.7 mmol/L      Chloride 103 mmol/L      CO2 24 mmol/L      ANION GAP 11 mmol/L      BUN 28 mg/dL      Creatinine 2.41 mg/dL      Glucose 131 mg/dL      Calcium 10.0 mg/dL      AST 39 U/L      ALT 57 U/L      Alkaline Phosphatase 62 U/L      Total Protein 8.2 g/dL      Albumin 5.1 g/dL      Total Bilirubin 1.18 mg/dL      eGFR 32 ml/min/1.73sq m     Narrative:      National Kid[employer] Foundation guidelines for Chronic Kidney Disease (CKD):     Stage 1 with normal or high GFR (GFR > 90 mL/min/1.73 square meters)    Stage 2 Mild CKD (GFR = 60-89 mL/min/1.73 square meters)    Stage 3A Moderate CKD (GFR = 45-59 mL/min/1.73 square meters)     Stage 3B Moderate CKD (GFR = 30-44 mL/min/1.73 square meters)    Stage 4 Severe CKD (GFR = 15-29 mL/min/1.73 square meters)    Stage 5 End Stage CKD (GFR <15 mL/min/1.73 square meters)  Note: GFR calculation is accurate only with a steady state creatinine    Lipase [[id number]]  (Normal) Collected: 12/22/24 1314    Lab Status: Final result Specimen: Blood from Arm, Left Updated: 12/22/24 1335     Lipase 26 u/L             CT abdomen pelvis with contrast   Final Interpretation by Juan Isabel MD (12/22 1529)      Findings suggestive of mild enteritis. Diverticulosis without evidence of diverticulitis or colitis.         Workstation performed: [id number]             Procedures    ED Medication and Procedure Management   None     Discharge Medication List as of 12/22/2024  3:51 PM        START taking these medications    Details   ondansetron (ZOFRAN-ODT) 4 mg disintegrating tablet Take 1 tablet (4 mg total) by mouth every 6 (six) hours as needed for nausea or vomiting, Starting Sun 12/22/2024, Normal           Outpatient Discharge Orders   Basic metabolic panel   Standing Status: Future Standing Exp. Date: 12/22/25     ED SEPSIS DOCUMENTATION   Time reflects when diagnosis was documented in both MDM as applicable and the Disposition within this note       Time User Action Codes Description Comment    12/22/2024  3:37 PM Christianne Gutierrez [R10.9] Abdominal pain     12/22/2024  3:37 PM Christianne Gutierrez [R11.2,  R19.7] Nausea vomiting and diarrhea     12/22/2024  3:38 PM Christianne Gutierrez [K52.9] Enteritis     12/22/2024  3:38 PM Christianne Gutierrez [R79.89] Elevated serum creatinine                  Christianne Gutierrez MD  12/22/24 7101

## 2024-12-27 NOTE — PROGRESS NOTES
Office Visit Note  24     Salvador Leung 39 y.o. male MRN: [id number]  : 1985    Assessment:     1. Cystitis  Assessment & Plan:  Patient with 2 episodes of blood in the urine diagnosed with cystitis received antibiotics both times CT scan also had shown some abnormality on the bladder area.  Patient was supposed to be seen by the urologist got postponed couple of times and he had to cancel 1 time not seen by them yet we will refer the patient again for the same.  Meanwhile we will get an ultrasound of the kidneys done repeat the urine and blood test.  Orders:  -     Ambulatory referral to Urology; Future  -     US kidney and bladder; Future; Expected date: 2024  2. Elevated serum creatinine  Assessment & Plan:  Patient with elevated serum creatinine 2.41 etiology needs to be determined we do not have any old blood test report to compare.  Will repeat the blood test with ultrasound of the kidneys and follow-up  Orders:  -     Ambulatory referral to Urology; Future  -     US kidney and bladder; Future; Expected date: 2024  3. Elevated liver enzymes  Assessment & Plan:  Patient with AST 39 ALT is elevated at 57 bilirubin at 1.18 we will follow-up with repeat labs we will check the hepatitis profile also.  4. Enteritis  Assessment & Plan:  Stable at present time no episodes of diarrhea no blood in the stool  5. Pre-diabetes  -     Comprehensive metabolic panel; Future  -     Hemoglobin A1C; Future  -     Urinalysis with microscopic; Future  6. Dyslipidemia  -     Lipid panel; Future  7. Screening for thyroid disorder  -     TSH, 3rd generation with Free T4 reflex; Future  8. Screening for deficiency anemia  -     CBC and differential; Future  9. Screening for prostate cancer  -     PSA, Total Screen; Future  10. Encounter for prostate cancer screening  -     PSA, Total Screen; Future  11. Need for hepatitis C screening test  -     Hepatitis C antibody; Future  -     Hepatitis C  antibody  12. Need for hepatitis B screening test  -     Hepatitis B surface antigen; Future  -     Hepatitis B surface antibody; Future  -     Hepatitis B surface antigen  -     Hepatitis B surface antibody  13. Vitamin D deficiency  -     Vitamin D 25 hydroxy; Future        Depression Screening and Follow-up Plan: Patient was screened for depression during today's encounter. They screened negative with a PHQ-2 score of 0.          Discussion Summary and Plan:  Today's care plan and medications were reviewed with patient in detail and all their questions answered to their satisfaction.    Chief Complaint   Patient presents with    Establish Care      Subjective:  Patient is coming here for evaluation regarding symptoms of diarrhea vomiting abdominal pain for which she was seen in the emergency room diagnosed having mild enteritis based on the CAT scan and was sent home on Zofran.  Patient had received IV fluids in the emergency room had lab work done which showed mildly elevated creatinine levels.  At present time patient is asymptomatic.    Patient had 2 episodes of hematuria blood in the urine first 1 was in the month of May 2 time in the month of July.  Diagnosed having cystitis based on the CAT scan done in the month of May and was given oral antibiotics both the times and sent home there was no blood work done at that time.    Currently patient not on any medications family history significant with father having blood clots 2 times and is on blood thinner we do not have all the details.  No history of diabetes or hypertension in the family that patient is aware of.    Patient is non-smoker occasionally drinks alcohol.  He is not  no children.  Patient's surgical history includes arthroscopic surgery on the right knee for meniscal tear and also for removal of scar tissue.  The meniscal tear was 20 years back and the scar tissue removal was last year.        The following portions of the patient's history  "were reviewed and updated as appropriate: allergies, current medications, past family history, past medical history, past social history, past surgical history and problem list.    Review of Systems   Constitutional:  Negative for chills and fever.   HENT:  Negative for ear pain and sore throat.    Eyes:  Negative for pain and visual disturbance.   Respiratory:  Negative for cough and shortness of breath.    Cardiovascular:  Negative for chest pain and palpitations.   Gastrointestinal:  Negative for abdominal pain and vomiting.   Genitourinary:  Negative for dysuria and hematuria.   Musculoskeletal:  Negative for arthralgias and back pain.   Skin:  Negative for color change and rash.   Neurological:  Negative for seizures and syncope.   All other systems reviewed and are negative.        Historical Information   Patient Active Problem List   Diagnosis    Enteritis    Cystitis    Elevated serum creatinine    Elevated liver enzymes     History reviewed. No pertinent past medical history.  Past Surgical History:   Procedure Laterality Date    KNEE SURGERY Right      Social History     Substance and Sexual Activity   Alcohol Use Yes    Comment: socially     Social History     Substance and Sexual Activity   Drug Use Never     Social History     Tobacco Use   Smoking Status Never   Smokeless Tobacco Never     Family History   Problem Relation Age of Onset    Clotting disorder Father     Bone cancer Paternal Grandfather      Health Maintenance Due   Topic    Hepatitis C Screening     HIV Screening     Annual Physical     DTaP,Tdap,and Td Vaccines (1 - Tdap)    Influenza Vaccine (1)    COVID-19 Vaccine (1 - 2024-25 season)      Meds/Allergies     No current outpatient medications on file.      Objective:    Vitals:   /61 (BP Location: Right arm, Patient Position: Sitting, Cuff Size: Large)   Pulse 82   Ht 5' 11\" (1.803 m)   Wt 98.2 kg (216 lb 9.6 oz)   SpO2 96%   BMI 30.21 kg/m²   Body mass index is 30.21 " kg/m².  Vitals:    12/31/24 0856   Weight: 98.2 kg (216 lb 9.6 oz)       Physical Exam  Vitals and nursing note reviewed.   Constitutional:       Appearance: Normal appearance.   Cardiovascular:      Rate and Rhythm: Normal rate and regular rhythm.      Heart sounds: Normal heart sounds.   Pulmonary:      Effort: Pulmonary effort is normal.      Breath sounds: Normal breath sounds.   Abdominal:      General: Abdomen is flat.      Palpations: Abdomen is soft.   Musculoskeletal:         General: Normal range of motion.      Right lower leg: No edema.      Left lower leg: No edema.   Skin:     General: Skin is warm and dry.   Neurological:      General: No focal deficit present.      Mental Status: He is alert and oriented to person, place, and time.   Psychiatric:         Mood and Affect: Mood normal.         Behavior: Behavior normal.         Lab Review   Admission on 12/22/2024, Discharged on 12/22/2024   Component Date Value Ref Range Status    WBC 12/22/2024 15.19 (H)  4.31 - 10.16 Thousand/uL Final    RBC 12/22/2024 5.36  3.88 - 5.62 Million/uL Final    Hemoglobin 12/22/2024 16.4  12.0 - 17.0 g/dL Final    Hematocrit 12/22/2024 49.2  36.5 - 49.3 % Final    MCV 12/22/2024 92  82 - 98 fL Final    MCH 12/22/2024 30.6  26.8 - 34.3 pg Final    MCHC 12/22/2024 33.3  31.4 - 37.4 g/dL Final    RDW 12/22/2024 12.6  11.6 - 15.1 % Final    MPV 12/22/2024 8.9  8.9 - 12.7 fL Final    Platelets 12/22/2024 308  149 - 390 Thousands/uL Final    nRBC 12/22/2024 0  /100 WBCs Final    This is an appended report.  These results have been appended to a previously preliminary verified report.    Segmented % 12/22/2024 96 (H)  43 - 75 % Final    Immature Grans % 12/22/2024 0  0 - 2 % Final    Lymphocytes % 12/22/2024 1 (L)  14 - 44 % Final    Monocytes % 12/22/2024 3 (L)  4 - 12 % Final    Eosinophils Relative 12/22/2024 0  0 - 6 % Final    Basophils Relative 12/22/2024 0  0 - 1 % Final    Absolute Neutrophils 12/22/2024 14.46 (H)  1.85  - 7.62 Thousands/µL Final    Absolute Immature Grans 12/22/2024 0.06  0.00 - 0.20 Thousand/uL Final    Absolute Lymphocytes 12/22/2024 0.22 (L)  0.60 - 4.47 Thousands/µL Final    Absolute Monocytes 12/22/2024 0.43  0.17 - 1.22 Thousand/µL Final    Eosinophils Absolute 12/22/2024 0.00  0.00 - 0.61 Thousand/µL Final    Basophils Absolute 12/22/2024 0.02  0.00 - 0.10 Thousands/µL Final    Sodium 12/22/2024 138  135 - 147 mmol/L Final    Potassium 12/22/2024 4.7  3.5 - 5.3 mmol/L Final    Chloride 12/22/2024 103  96 - 108 mmol/L Final    CO2 12/22/2024 24  21 - 32 mmol/L Final    ANION GAP 12/22/2024 11  4 - 13 mmol/L Final    BUN 12/22/2024 28 (H)  5 - 25 mg/dL Final    Creatinine 12/22/2024 2.41 (H)  0.60 - 1.30 mg/dL Final    Standardized to IDMS reference method    Glucose 12/22/2024 131  65 - 140 mg/dL Final    If the patient is fasting, the ADA then defines impaired fasting glucose as > 100 mg/dL and diabetes as > or equal to 123 mg/dL.    Calcium 12/22/2024 10.0  8.4 - 10.2 mg/dL Final    AST 12/22/2024 39  13 - 39 U/L Final    ALT 12/22/2024 57 (H)  7 - 52 U/L Final    Specimen collection should occur prior to Sulfasalazine administration due to the potential for falsely depressed results.     Alkaline Phosphatase 12/22/2024 62  34 - 104 U/L Final    Total Protein 12/22/2024 8.2  6.4 - 8.4 g/dL Final    Albumin 12/22/2024 5.1 (H)  3.5 - 5.0 g/dL Final    Total Bilirubin 12/22/2024 1.18 (H)  0.20 - 1.00 mg/dL Final    Use of this assay is not recommended for patients undergoing treatment with eltrombopag due to the potential for falsely elevated results.  N-acetyl-p-benzoquinone imine (metabolite of Acetaminophen) will generate erroneously low results in samples for patients that have taken an overdose of Acetaminophen.    eGFR 12/22/2024 32  ml/min/1.73sq m Final    Lipase 12/22/2024 26  11 - 82 u/L Final    RBC Morphology 12/22/2024 Normal   Final    Platelet Estimate 12/22/2024 Adequate  Adequate Final  "        Jose Zhang MD        \"This note has been constructed using a voice recognition system.Therefore there may be syntax, spelling, and/or grammatical errors. Please call if you have any questions. \"  "

## 2024-12-31 ENCOUNTER — OFFICE VISIT (OUTPATIENT)
Dept: FAMILY MEDICINE CLINIC | Facility: CLINIC | Age: 39
End: 2024-12-31
Payer: COMMERCIAL

## 2024-12-31 VITALS
HEART RATE: 82 BPM | BODY MASS INDEX: 30.32 KG/M2 | HEIGHT: 71 IN | DIASTOLIC BLOOD PRESSURE: 61 MMHG | WEIGHT: 216.6 LBS | SYSTOLIC BLOOD PRESSURE: 100 MMHG | OXYGEN SATURATION: 96 %

## 2024-12-31 DIAGNOSIS — E55.9 VITAMIN D DEFICIENCY: ICD-10-CM

## 2024-12-31 DIAGNOSIS — R73.03 PRE-DIABETES: ICD-10-CM

## 2024-12-31 DIAGNOSIS — Z13.29 SCREENING FOR THYROID DISORDER: ICD-10-CM

## 2024-12-31 DIAGNOSIS — K52.9 ENTERITIS: ICD-10-CM

## 2024-12-31 DIAGNOSIS — Z11.59 NEED FOR HEPATITIS C SCREENING TEST: ICD-10-CM

## 2024-12-31 DIAGNOSIS — N30.90 CYSTITIS: Primary | ICD-10-CM

## 2024-12-31 DIAGNOSIS — Z11.59 NEED FOR HEPATITIS B SCREENING TEST: ICD-10-CM

## 2024-12-31 DIAGNOSIS — E78.5 DYSLIPIDEMIA: ICD-10-CM

## 2024-12-31 DIAGNOSIS — Z12.5 ENCOUNTER FOR PROSTATE CANCER SCREENING: ICD-10-CM

## 2024-12-31 DIAGNOSIS — Z12.5 SCREENING FOR PROSTATE CANCER: ICD-10-CM

## 2024-12-31 DIAGNOSIS — R74.8 ELEVATED LIVER ENZYMES: ICD-10-CM

## 2024-12-31 DIAGNOSIS — R79.89 ELEVATED SERUM CREATININE: ICD-10-CM

## 2024-12-31 DIAGNOSIS — Z13.0 SCREENING FOR DEFICIENCY ANEMIA: ICD-10-CM

## 2024-12-31 PROCEDURE — 99204 OFFICE O/P NEW MOD 45 MIN: CPT | Performed by: INTERNAL MEDICINE

## 2024-12-31 NOTE — ASSESSMENT & PLAN NOTE
Patient with elevated serum creatinine 2.41 etiology needs to be determined we do not have any old blood test report to compare.  Will repeat the blood test with ultrasound of the kidneys and follow-up

## 2024-12-31 NOTE — ASSESSMENT & PLAN NOTE
Patient with 2 episodes of blood in the urine diagnosed with cystitis received antibiotics both times CT scan also had shown some abnormality on the bladder area.  Patient was supposed to be seen by the urologist got postponed couple of times and he had to cancel 1 time not seen by them yet we will refer the patient again for the same.  Meanwhile we will get an ultrasound of the kidneys done repeat the urine and blood test.

## 2025-01-01 LAB
HBV SURFACE AB SER-ACNC: 133 MIU/ML
HBV SURFACE AG SERPL QL IA: NEGATIVE
HCV AB S/CO SERPL IA: NON REACTIVE

## 2025-01-03 ENCOUNTER — RESULTS FOLLOW-UP (OUTPATIENT)
Dept: FAMILY MEDICINE CLINIC | Facility: CLINIC | Age: 40
End: 2025-01-03

## 2025-01-11 ENCOUNTER — HOSPITAL ENCOUNTER (OUTPATIENT)
Dept: RADIOLOGY | Facility: HOSPITAL | Age: 40
Discharge: HOME | End: 2025-01-11
Payer: COMMERCIAL

## 2025-01-11 DIAGNOSIS — R79.89 ELEVATED SERUM CREATININE: ICD-10-CM

## 2025-01-11 DIAGNOSIS — N30.90 CYSTITIS: ICD-10-CM

## 2025-01-11 PROCEDURE — 76775 US EXAM ABDO BACK WALL LIM: CPT

## 2025-01-23 LAB
25(OH)D3+25(OH)D2 SERPL-MCNC: 4.8 NG/ML (ref 30–100)
ALBUMIN SERPL-MCNC: 4.3 G/DL (ref 4.1–5.1)
ALP SERPL-CCNC: 68 IU/L (ref 44–121)
ALT SERPL-CCNC: 17 IU/L (ref 0–44)
APPEARANCE UR: ABNORMAL
AST SERPL-CCNC: 21 IU/L (ref 0–40)
BACTERIA URNS QL MICRO: ABNORMAL
BASOPHILS # BLD AUTO: 0 X10E3/UL (ref 0–0.2)
BASOPHILS NFR BLD AUTO: 1 %
BILIRUB SERPL-MCNC: 1 MG/DL (ref 0–1.2)
BILIRUB UR QL STRIP: NEGATIVE
BUN SERPL-MCNC: 9 MG/DL (ref 6–24)
BUN/CREAT SERPL: 8 (ref 9–20)
CALCIUM SERPL-MCNC: 9.4 MG/DL (ref 8.7–10.2)
CASTS URNS QL MICRO: ABNORMAL /LPF
CHLORIDE SERPL-SCNC: 103 MMOL/L (ref 96–106)
CHOLEST SERPL-MCNC: 242 MG/DL (ref 100–199)
CO2 SERPL-SCNC: 25 MMOL/L (ref 20–29)
COLOR UR: YELLOW
CREAT SERPL-MCNC: 1.18 MG/DL (ref 0.76–1.27)
EGFR: 80 ML/MIN/1.73
EOSINOPHIL # BLD AUTO: 0.2 X10E3/UL (ref 0–0.4)
EOSINOPHIL NFR BLD AUTO: 5 %
EPI CELLS #/AREA URNS HPF: ABNORMAL /HPF (ref 0–10)
ERYTHROCYTE [DISTWIDTH] IN BLOOD BY AUTOMATED COUNT: 12.5 % (ref 11.6–15.4)
GLOBULIN SER-MCNC: 2.8 G/DL (ref 1.5–4.5)
GLUCOSE SERPL-MCNC: 98 MG/DL (ref 70–99)
GLUCOSE UR QL: NEGATIVE
HBA1C MFR BLD: 5.5 % (ref 4.8–5.6)
HCT VFR BLD AUTO: 45.7 % (ref 37.5–51)
HDLC SERPL-MCNC: 56 MG/DL
HGB BLD-MCNC: 14.8 G/DL (ref 13–17.7)
HGB UR QL STRIP: ABNORMAL
IMM GRANULOCYTES # BLD: 0 X10E3/UL (ref 0–0.1)
IMM GRANULOCYTES NFR BLD: 0 %
KETONES UR QL STRIP: NEGATIVE
LDLC SERPL CALC-MCNC: 166 MG/DL (ref 0–99)
LEUKOCYTE ESTERASE UR QL STRIP: ABNORMAL
LYMPHOCYTES # BLD AUTO: 1.8 X10E3/UL (ref 0.7–3.1)
LYMPHOCYTES NFR BLD AUTO: 39 %
MCH RBC QN AUTO: 31 PG (ref 26.6–33)
MCHC RBC AUTO-ENTMCNC: 32.4 G/DL (ref 31.5–35.7)
MCV RBC AUTO: 96 FL (ref 79–97)
MICRO URNS: ABNORMAL
MONOCYTES # BLD AUTO: 0.5 X10E3/UL (ref 0.1–0.9)
MONOCYTES NFR BLD AUTO: 11 %
NEUTROPHILS # BLD AUTO: 2 X10E3/UL (ref 1.4–7)
NEUTROPHILS NFR BLD AUTO: 44 %
NITRITE UR QL STRIP: POSITIVE
PH UR STRIP: 6 [PH] (ref 5–7.5)
PLATELET # BLD AUTO: 293 X10E3/UL (ref 150–450)
POTASSIUM SERPL-SCNC: 4.3 MMOL/L (ref 3.5–5.2)
PROT SERPL-MCNC: 7.1 G/DL (ref 6–8.5)
PROT UR QL STRIP: ABNORMAL
PSA SERPL-MCNC: 0.9 NG/ML (ref 0–4)
RBC # BLD AUTO: 4.77 X10E6/UL (ref 4.14–5.8)
RBC #/AREA URNS HPF: ABNORMAL /HPF (ref 0–2)
SL AMB VLDL CHOLESTEROL CALC: 20 MG/DL (ref 5–40)
SODIUM SERPL-SCNC: 142 MMOL/L (ref 134–144)
SP GR UR: >=1.03 (ref 1–1.03)
TRIGL SERPL-MCNC: 113 MG/DL (ref 0–149)
TSH SERPL DL<=0.005 MIU/L-ACNC: 1.54 UIU/ML (ref 0.45–4.5)
UROBILINOGEN UR STRIP-ACNC: 0.2 MG/DL (ref 0.2–1)
WBC # BLD AUTO: 4.5 X10E3/UL (ref 3.4–10.8)
WBC #/AREA URNS HPF: >30 /HPF (ref 0–5)

## 2025-01-25 ENCOUNTER — RESULTS FOLLOW-UP (OUTPATIENT)
Dept: FAMILY MEDICINE CLINIC | Facility: CLINIC | Age: 40
End: 2025-01-25

## 2025-01-27 ENCOUNTER — OFFICE VISIT (OUTPATIENT)
Dept: UROLOGY | Facility: CLINIC | Age: 40
End: 2025-01-27
Payer: COMMERCIAL

## 2025-01-27 VITALS
HEART RATE: 65 BPM | OXYGEN SATURATION: 97 % | HEIGHT: 71 IN | DIASTOLIC BLOOD PRESSURE: 80 MMHG | WEIGHT: 192 LBS | SYSTOLIC BLOOD PRESSURE: 120 MMHG | BODY MASS INDEX: 26.88 KG/M2

## 2025-01-27 DIAGNOSIS — R79.89 ELEVATED SERUM CREATININE: ICD-10-CM

## 2025-01-27 DIAGNOSIS — N30.90 CYSTITIS: Primary | ICD-10-CM

## 2025-01-27 PROCEDURE — 87086 URINE CULTURE/COLONY COUNT: CPT

## 2025-01-27 PROCEDURE — 99213 OFFICE O/P EST LOW 20 MIN: CPT

## 2025-01-27 PROCEDURE — 81001 URINALYSIS AUTO W/SCOPE: CPT

## 2025-01-27 NOTE — PROGRESS NOTES
Office Visit- Urology  Salvador Leung 1985 MRN: [id number]      Assessment/Discussion/Plan    39 y.o. male managed by     Recurrent UTI  -Patient had episode of blood within the urine in May 2024 thus associated with dysuria and frequency.  Urine culture demonstrated growth of ESBL E. Coli  -Patient had repeat episode of dysuria and gross hematuria.  Repeat urine culture demonstrated growth of ESBL E. coli  -Patient had a noncontrast CT scan in May 2024 that demonstrated circumferential bladder wall thickening consistent with cystitis but no urinary tract calculi or hydronephrosis.  Patient had subsequent CT of the abdomen with without contrast with no significant urologic finding.  Ultrasound of the kidneys and bladder in January 2025 with no significant anatomic finding  -Will plan to evaluate further with cystoscopy to evaluate the lower inner tract to ensure no stricture disease or bladder abnormality    2.  Prostate cancer screening  -PSA of 0.9 in January 2025      Chief Complaint:   Salvador is a 39 y.o. male presenting to the office for an initial evaluation regarding recurrent UTI        Subjective    Patient is a 39-year-old male with no significant past urologic history who presents to the office today for evaluation of 2 episodes of urinary tract infection caused by ESBL E. coli.  He states that in May 2024 and then in July 2024 he experienced gross hematuria as well as some irritative urine tract symptoms.  Both times urine culture demonstrated ESBL E. coli and he received antibiotic therapy with resolution of symptoms.  He has not had any symptoms as point in time.  He denies recurrent gross hematuria, dysuria, frequency, urgency, spraying of the urinary stream, difficulty with urination, flank pain.  No tobacco history known occupational exposure to chemicals, family history of  malignancy.  PSA returned at 0.9 January 2025.  He had 3 images of his urinary tract within the past year with a  contrasted CT, noncontrast head CT and ultrasound the kidneys and bladder with no significant abnormality identified      AUA SYMPTOM SCORE      Flowsheet Row Most Recent Value   AUA SYMPTOM SCORE    How often have you had a sensation of not emptying your bladder completely after you finished urinating? 1 (P)     How often have you had to urinate again less than two hours after you finished urinating? 0 (P)     How often have you found you stopped and started again several times when you urinate? 0 (P)     How often have you found it difficult to postpone urination? 0 (P)     How often have you had a weak urinary stream? 0 (P)     How often have you had to push or strain to begin urination? 1 (P)     How many times did you most typically get up to urinate from the time you went to bed at night until the time you got up in the morning? 0 (P)     Quality of Life: If you were to spend the rest of your life with your urinary condition just the way it is now, how would you feel about that? 0 (P)     AUA SYMPTOM SCORE 2 (P)              ROS:   Review of Systems   Constitutional: Negative.  Negative for chills, fatigue and fever.   HENT: Negative.     Respiratory:  Negative for shortness of breath.    Cardiovascular:  Negative for chest pain.   Gastrointestinal: Negative.  Negative for abdominal pain.   Endocrine: Negative.    Musculoskeletal: Negative.    Skin: Negative.    Neurological: Negative.  Negative for dizziness and light-headedness.   Hematological: Negative.    Psychiatric/Behavioral: Negative.           Past Medical History  History reviewed. No pertinent past medical history.    Past Surgical History  Past Surgical History:   Procedure Laterality Date    KNEE SURGERY Right        Past Family History  Family History   Problem Relation Age of Onset    Clotting disorder Father     Bone cancer Paternal Grandfather        Past Social history  Social History     Socioeconomic History    Marital status: Single      "Spouse name: Not on file    Number of children: Not on file    Years of education: Not on file    Highest education level: Not on file   Occupational History    Not on file   Tobacco Use    Smoking status: Never    Smokeless tobacco: Never   Vaping Use    Vaping status: Never Used   Substance and Sexual Activity    Alcohol use: Yes     Comment: socially    Drug use: Never    Sexual activity: Not on file   Other Topics Concern    Not on file   Social History Narrative    Not on file     Social Drivers of Health     Financial Resource Strain: Not on file   Food Insecurity: Not on file   Transportation Needs: Not on file   Physical Activity: Not on file   Stress: Not on file   Social Connections: Not on file   Intimate Partner Violence: Not on file   Housing Stability: Not on file       Current Medications  No current outpatient medications on file.     No current facility-administered medications for this visit.       Allergies  Allergies   Allergen Reactions    Eggs Or Egg-Derived Products - Food Allergy Shortness Of Breath       OBJECTIVE    Vitals   Vitals:    01/27/25 1419   BP: 120/80   BP Location: Left arm   Patient Position: Sitting   Cuff Size: Standard   Pulse: 65   SpO2: 97%   Weight: 87.1 kg (192 lb)   Height: 5' 11\" (1.803 m)       PVR:    Physical Exam  Constitutional:       General: He is not in acute distress.     Appearance: Normal appearance. He is normal weight. He is not ill-appearing or toxic-appearing.   HENT:      Head: Normocephalic and atraumatic.   Eyes:      Conjunctiva/sclera: Conjunctivae normal.   Cardiovascular:      Rate and Rhythm: Normal rate.   Pulmonary:      Effort: Pulmonary effort is normal. No respiratory distress.   Skin:     General: Skin is warm and dry.   Neurological:      General: No focal deficit present.      Mental Status: He is alert and oriented to person, place, and time.      Cranial Nerves: No cranial nerve deficit.   Psychiatric:         Mood and Affect: Mood " normal.         Behavior: Behavior normal.         Thought Content: Thought content normal.          Labs:      Lab Results   Component Value Date    PSA 0.9 01/18/2025     Lab Results   Component Value Date    CREATININE 1.18 01/18/2025      Lab Results   Component Value Date    HGBA1C 5.5 01/18/2025     Lab Results   Component Value Date    CALCIUM 10.0 12/22/2024    K 4.3 01/18/2025    CO2 25 01/18/2025     01/18/2025    BUN 9 01/18/2025    CREATININE 1.18 01/18/2025       I have personally reviewed all pertinent lab results and reviewed with patient    Imaging       Ilan Carter PA-C  Date: 1/27/2025 Time: 2:33 PM  [facility]    This note was written using fluency dictation software. Please excuse any resulting minor grammatical errors.      Answers submitted by the patient for this visit:  Painful Urination Questionnaire (Submitted on 1/22/2025)  Chief Complaint: Dysuria  Chronicity: recurrent  Onset: more than 1 month ago  Frequency: intermittently  Progression since onset: resolved  Pain quality: shooting  Pain severity: no pain  Pain - numeric: 7/10  Fever: no fever  Fever duration: less than 1 day  Sexually active?: Yes  History of pyelonephritis?: No  hesitancy: No  discharge: Yes  possible pregnancy: No  sweats: Yes

## 2025-01-28 LAB
BACTERIA UR CULT: NORMAL
BACTERIA UR QL AUTO: ABNORMAL /HPF
BILIRUB UR QL STRIP: NEGATIVE
CLARITY UR: CLEAR
COLOR UR: ABNORMAL
GLUCOSE UR STRIP-MCNC: NEGATIVE MG/DL
HGB UR QL STRIP.AUTO: NEGATIVE
KETONES UR STRIP-MCNC: NEGATIVE MG/DL
LEUKOCYTE ESTERASE UR QL STRIP: NEGATIVE
MUCOUS THREADS UR QL AUTO: ABNORMAL
NITRITE UR QL STRIP: NEGATIVE
NON-SQ EPI CELLS URNS QL MICRO: ABNORMAL /HPF
PH UR STRIP.AUTO: 7 [PH]
PROT UR STRIP-MCNC: NEGATIVE MG/DL
RBC #/AREA URNS AUTO: ABNORMAL /HPF
SP GR UR STRIP.AUTO: 1.01 (ref 1–1.03)
UROBILINOGEN UR STRIP-ACNC: <2 MG/DL
WBC #/AREA URNS AUTO: ABNORMAL /HPF

## 2025-01-29 ENCOUNTER — RESULTS FOLLOW-UP (OUTPATIENT)
Dept: UROLOGY | Facility: CLINIC | Age: 40
End: 2025-01-29

## 2025-01-29 NOTE — TELEPHONE ENCOUNTER
Spoke to pt informed pt Urine testing without infection     ----- Message from Ilan Carter PA-C sent at 1/29/2025  3:01 PM EST -----  Urine testing without infection

## 2025-02-03 NOTE — PROGRESS NOTES
Office Visit Note  25     Salvador Leung 39 y.o. male MRN: [id number]  : 1985    Assessment:     1. Dyslipidemia  Assessment & Plan:  Lipid profile shows cholesterol 242 triglycerides 113 HDL 56 .  Mother has a history of high cholesterol no history of coronary artery disease.  Patient admits to eating a lot of fried food cheesy foods recommend very strongly to follow strict diet no fried foods less of cheese butter 2% milk will follow with repeat lab in about 3 to 4 months time if the numbers are still coming up I may need to start him on medication.  Orders:  -     Lipid panel; Future; Expected date: 2025  -     Lipid panel  2. Cystitis  Assessment & Plan:  As mentioned in HPI 2 episodes of blood in the urine diagnosed with cystitis with ESBL received antibiotics patient also had CAT scan done which showed some abnormality in the bladder area.  I have ordered the last visit ultrasound which came back normal.  Patient was seen by the urologist scheduled to have a cystoscopy done in the month of May.  Currently patient is asymptomatic recommend patient to drink more water.  3. Elevated liver enzymes  Assessment & Plan:  Repeat liver enzymes came back normal hepatitis profile was negative.  4. Elevated serum creatinine  Assessment & Plan:  Repeat chemistry shows creatinine at 1.18 it was 2.41 before much better with a GFR of 80.  Will continue to monitor periodic labs.             Discussion Summary and Plan:  Today's care plan and medications were reviewed with patient in detail and all their questions answered to their satisfaction.    Chief Complaint   Patient presents with   • Follow-up      Subjective:  Patient is coming here for a follow-up evaluation with a history of recurrent urinary tract infection with ESBL history of hematuria.  Patient had CAT scans couple of times and also had an ultrasound done came back unremarkable with some thickening in the bladder area on the CAT scan  noted earlier.  Patient was seen by the urologist scheduled for getting a cystoscopy in the month of May.    Patient had lab work done we were concerned about the elevated creatinine in the first lab report with repeat 1 came back normal urine also appears clean compared to before.    Patient is currently not on any medications labs showing cholesterol level coming up high        The following portions of the patient's history were reviewed and updated as appropriate: allergies, current medications, past family history, past medical history, past social history, past surgical history and problem list.    Review of Systems   Constitutional:  Negative for chills and fever.   HENT:  Negative for ear pain and sore throat.    Eyes:  Negative for pain and visual disturbance.   Respiratory:  Negative for cough and shortness of breath.    Cardiovascular:  Negative for chest pain and palpitations.   Gastrointestinal:  Negative for abdominal pain and vomiting.   Genitourinary:  Negative for dysuria and hematuria.   Musculoskeletal:  Negative for arthralgias and back pain.   Skin:  Negative for color change and rash.   Neurological:  Negative for seizures and syncope.   All other systems reviewed and are negative.        Historical Information   Patient Active Problem List   Diagnosis   • Enteritis   • Cystitis   • Elevated serum creatinine   • Elevated liver enzymes   • Dyslipidemia     History reviewed. No pertinent past medical history.  Past Surgical History:   Procedure Laterality Date   • KNEE SURGERY Right      Social History     Substance and Sexual Activity   Alcohol Use Yes    Comment: socially     Social History     Substance and Sexual Activity   Drug Use Never     Social History     Tobacco Use   Smoking Status Never   Smokeless Tobacco Never     Family History   Problem Relation Age of Onset   • Clotting disorder Father    • Bone cancer Paternal Grandfather      Health Maintenance Due   Topic   • HIV Screening   "  • Annual Physical    • DTaP,Tdap,and Td Vaccines (1 - Tdap)   • Influenza Vaccine (1)   • COVID-19 Vaccine (1 - 2024-25 season)      Meds/Allergies     No current outpatient medications on file.      Objective:    Vitals:   /72 (BP Location: Right arm, Patient Position: Sitting, Cuff Size: Standard)   Pulse 71   Ht 5' 11\" (1.803 m)   Wt 97.1 kg (214 lb)   SpO2 97%   BMI 29.85 kg/m²   Body mass index is 29.85 kg/m².  Vitals:    02/04/25 1533   Weight: 97.1 kg (214 lb)       Physical Exam  Vitals and nursing note reviewed.   Constitutional:       Appearance: Normal appearance.   Cardiovascular:      Rate and Rhythm: Normal rate and regular rhythm.      Heart sounds: Normal heart sounds.   Pulmonary:      Effort: Pulmonary effort is normal.      Breath sounds: Normal breath sounds.   Abdominal:      General: Abdomen is flat.      Palpations: Abdomen is soft.   Musculoskeletal:         General: Normal range of motion.      Right lower leg: No edema.      Left lower leg: No edema.   Skin:     General: Skin is warm and dry.      Capillary Refill: Capillary refill takes less than 2 seconds.   Neurological:      General: No focal deficit present.      Mental Status: He is alert and oriented to person, place, and time.   Psychiatric:         Behavior: Behavior normal.         Lab Review   Office Visit on 01/27/2025   Component Date Value Ref Range Status   • Urine Culture 01/27/2025 10,000-19,000 cfu/ml   Final    Mixed Contaminants X3   • Color, UA 01/27/2025 Light Yellow   Final   • Clarity, UA 01/27/2025 Clear   Final   • Specific Gravity, UA 01/27/2025 1.012  1.003 - 1.030 Final   • pH, UA 01/27/2025 7.0  4.5, 5.0, 5.5, 6.0, 6.5, 7.0, 7.5, 8.0 Final   • Leukocytes, UA 01/27/2025 Negative  Negative Final   • Nitrite, UA 01/27/2025 Negative  Negative Final   • Protein, UA 01/27/2025 Negative  Negative mg/dl Final   • Glucose, UA 01/27/2025 Negative  Negative mg/dl Final   • Ketones, UA 01/27/2025 Negative  " Negative mg/dl Final   • Urobilinogen, UA 01/27/2025 <2.0  <2.0 mg/dl mg/dl Final   • Bilirubin, UA 01/27/2025 Negative  Negative Final   • Occult Blood, UA 01/27/2025 Negative  Negative Final   • RBC, UA 01/27/2025 1-2  None Seen, 1-2 /hpf Final   • WBC, UA 01/27/2025 1-2  None Seen, 1-2 /hpf Final   • Epithelial Cells 01/27/2025 Occasional  None Seen, Occasional /hpf Final   • Bacteria, UA 01/27/2025 None Seen  None Seen, Occasional /hpf Final   • MUCUS THREADS 01/27/2025 Occasional (A)  None Seen Final   Orders Only on 01/18/2025   Component Date Value Ref Range Status   • White Blood Cell Count 01/18/2025 4.5  3.4 - 10.8 x10E3/uL Final   • Red Blood Cell Count 01/18/2025 4.77  4.14 - 5.80 x10E6/uL Final   • Hemoglobin 01/18/2025 14.8  13.0 - 17.7 g/dL Final   • HCT 01/18/2025 45.7  37.5 - 51.0 % Final   • MCV 01/18/2025 96  79 - 97 fL Final   • MCH 01/18/2025 31.0  26.6 - 33.0 pg Final   • MCHC 01/18/2025 32.4  31.5 - 35.7 g/dL Final   • RDW 01/18/2025 12.5  11.6 - 15.4 % Final   • Platelet Count 01/18/2025 293  150 - 450 x10E3/uL Final   • Neutrophils 01/18/2025 44  Not Estab. % Final   • Lymphocytes 01/18/2025 39  Not Estab. % Final   • Monocytes 01/18/2025 11  Not Estab. % Final   • Eosinophils 01/18/2025 5  Not Estab. % Final   • Basophils PCT 01/18/2025 1  Not Estab. % Final   • Neutrophils (Absolute) 01/18/2025 2.0  1.4 - 7.0 x10E3/uL Final   • Lymphocytes (Absolute) 01/18/2025 1.8  0.7 - 3.1 x10E3/uL Final   • Monocytes (Absolute) 01/18/2025 0.5  0.1 - 0.9 x10E3/uL Final   • Eosinophils (Absolute) 01/18/2025 0.2  0.0 - 0.4 x10E3/uL Final   • Basophils ABS 01/18/2025 0.0  0.0 - 0.2 x10E3/uL Final   • Immature Granulocytes 01/18/2025 0  Not Estab. % Final   • Immature Granulocytes (Absolute) 01/18/2025 0.0  0.0 - 0.1 x10E3/uL Final   • Glucose, Random 01/18/2025 98  70 - 99 mg/dL Final   • BUN 01/18/2025 9  6 - 24 mg/dL Final   • Creatinine 01/18/2025 1.18  0.76 - 1.27 mg/dL Final   • eGFR 01/18/2025 80   >59 mL/min/1.73 Final   • SL AMB BUN/CREATININE RATIO 01/18/2025 8 (L)  9 - 20 Final   • Sodium 01/18/2025 142  134 - 144 mmol/L Final   • Potassium 01/18/2025 4.3  3.5 - 5.2 mmol/L Final   • Chloride 01/18/2025 103  96 - 106 mmol/L Final   • CO2 01/18/2025 25  20 - 29 mmol/L Final   • CALCIUM 01/18/2025 9.4  8.7 - 10.2 mg/dL Final   • Protein, Total 01/18/2025 7.1  6.0 - 8.5 g/dL Final   • Albumin 01/18/2025 4.3  4.1 - 5.1 g/dL Final   • Globulin, Total 01/18/2025 2.8  1.5 - 4.5 g/dL Final   • TOTAL BILIRUBIN 01/18/2025 1.0  0.0 - 1.2 mg/dL Final   • Alk Phos Isoenzymes 01/18/2025 68  44 - 121 IU/L Final   • AST 01/18/2025 21  0 - 40 IU/L Final   • ALT 01/18/2025 17  0 - 44 IU/L Final   • Specific Gravity 01/18/2025      >=1.030 (A)  1.005 - 1.030 Final   • Ph 01/18/2025 6.0  5.0 - 7.5 Final   • Color UA 01/18/2025 Yellow  Yellow Final   • Urine Appearance 01/18/2025 Cloudy (A)  Clear Final   • Leukocyte Esterase 01/18/2025 1+ (A)  Negative Final   • Protein 01/18/2025 1+ (A)  Negative/Trace Final   • Glucose, 24 HR Urine 01/18/2025 Negative  Negative Final   • Ketone, Urine 01/18/2025 Negative  Negative Final   • Blood, Urine 01/18/2025 Trace (A)  Negative Final   • Bilirubin, Urine 01/18/2025 Negative  Negative Final   • Urobilinogen Urine 01/18/2025 0.2  0.2 - 1.0 mg/dL Final   • Nitrites Urine 01/18/2025 Positive (A)  Negative Final   • Microscopic Examination 01/18/2025 See below:   Final    Microscopic was indicated and was performed.   • SL AMB WBC, URINE 01/18/2025 >30 (A)  0 - 5 /hpf Final   • RBC, Urine 01/18/2025 0-2  0 - 2 /hpf Final   • Epithelial Cells (non renal) 01/18/2025 0-10  0 - 10 /hpf Final   • Casts 01/18/2025 None seen  None seen /lpf Final   • Bacteria, Urine 01/18/2025 Many (A)  None seen/Few Final   • Cholesterol, Total 01/18/2025 242 (H)  100 - 199 mg/dL Final   • Triglycerides 01/18/2025 113  0 - 149 mg/dL Final   • HDL 01/18/2025 56  >39 mg/dL Final   • VLDL Cholesterol Calculated  01/18/2025 20  5 - 40 mg/dL Final   • LDL Calculated 01/18/2025 166 (H)  0 - 99 mg/dL Final   • Hemoglobin A1C 01/18/2025 5.5  4.8 - 5.6 % Final    Comment:          Prediabetes: 5.7 - 6.4           Diabetes: >6.4           Glycemic control for adults with diabetes: <7.0     • TSH 01/18/2025 1.540  0.450 - 4.500 uIU/mL Final   • 25-HYDROXY VIT D 01/18/2025 4.8 (L)  30.0 - 100.0 ng/mL Final    Comment: Vitamin D deficiency has been defined by the [place] of  Medicine and an Endocrine Society practice guideline as a  level of serum 25-OH vitamin D less than 20 ng/mL (1,2).  The Endocrine Society went on to further define vitamin D  insufficiency as a level between 21 and 29 ng/mL (2).  1. IOM ([place] of [employer]). 2010. Dietary reference     intakes for calcium and D. [place]: The     National Academies Press.  2. Ricki MF, Dileep KIMBROUGH, Leroy MÁRQUEZ, et al.     Evaluation, treatment, and prevention of vitamin D     deficiency: an Endocrine Society clinical practice     guideline. JCEM. 2011 Jul; 96(7):1911-30.     • Prostate Specific Antigen Total 01/18/2025 0.9  0.0 - 4.0 ng/mL Final    Comment: Roche ECLIA methodology.  According to the American Urological Association, Serum PSA should  decrease and remain at undetectable levels after radical  prostatectomy. The AUA defines biochemical recurrence as an initial  PSA value 0.2 ng/mL or greater followed by a subsequent confirmatory  PSA value 0.2 ng/mL or greater.  Values obtained with different assay methods or kits cannot be used  interchangeably. Results cannot be interpreted as absolute evidence  of the presence or absence of malignant disease.     Office Visit on 12/31/2024   Component Date Value Ref Range Status   • HEP C AB 12/31/2024 Non Reactive  Non Reactive Final    Comment: HCV antibody alone does not differentiate between previously  resolved infection and active infection. Equivocal and Reactive  HCV antibody results should be followed  up with an HCV RNA test  to support the diagnosis of active HCV infection.     • HBsAg Screen 12/31/2024 Negative  Negative Final   • Hepatitis B Surface Ab Quant 12/31/2024 133.0  Immunity>10 mIU/mL Final    Comment:   Status of Immunity                     Anti-HBs Level    ------------------                     --------------  Inconsistent with Immunity                  0.0 - 10.0  Consistent with Immunity                         >10.0     Admission on 12/22/2024, Discharged on 12/22/2024   Component Date Value Ref Range Status   • WBC 12/22/2024 15.19 (H)  4.31 - 10.16 Thousand/uL Final   • RBC 12/22/2024 5.36  3.88 - 5.62 Million/uL Final   • Hemoglobin 12/22/2024 16.4  12.0 - 17.0 g/dL Final   • Hematocrit 12/22/2024 49.2  36.5 - 49.3 % Final   • MCV 12/22/2024 92  82 - 98 fL Final   • MCH 12/22/2024 30.6  26.8 - 34.3 pg Final   • MCHC 12/22/2024 33.3  31.4 - 37.4 g/dL Final   • RDW 12/22/2024 12.6  11.6 - 15.1 % Final   • MPV 12/22/2024 8.9  8.9 - 12.7 fL Final   • Platelets 12/22/2024 308  149 - 390 Thousands/uL Final   • nRBC 12/22/2024 0  /100 WBCs Final    This is an appended report.  These results have been appended to a previously preliminary verified report.   • Segmented % 12/22/2024 96 (H)  43 - 75 % Final   • Immature Grans % 12/22/2024 0  0 - 2 % Final   • Lymphocytes % 12/22/2024 1 (L)  14 - 44 % Final   • Monocytes % 12/22/2024 3 (L)  4 - 12 % Final   • Eosinophils Relative 12/22/2024 0  0 - 6 % Final   • Basophils Relative 12/22/2024 0  0 - 1 % Final   • Absolute Neutrophils 12/22/2024 14.46 (H)  1.85 - 7.62 Thousands/µL Final   • Absolute Immature Grans 12/22/2024 0.06  0.00 - 0.20 Thousand/uL Final   • Absolute Lymphocytes 12/22/2024 0.22 (L)  0.60 - 4.47 Thousands/µL Final   • Absolute Monocytes 12/22/2024 0.43  0.17 - 1.22 Thousand/µL Final   • Eosinophils Absolute 12/22/2024 0.00  0.00 - 0.61 Thousand/µL Final   • Basophils Absolute 12/22/2024 0.02  0.00 - 0.10 Thousands/µL Final   • Sodium  "12/22/2024 138  135 - 147 mmol/L Final   • Potassium 12/22/2024 4.7  3.5 - 5.3 mmol/L Final   • Chloride 12/22/2024 103  96 - 108 mmol/L Final   • CO2 12/22/2024 24  21 - 32 mmol/L Final   • ANION GAP 12/22/2024 11  4 - 13 mmol/L Final   • BUN 12/22/2024 28 (H)  5 - 25 mg/dL Final   • Creatinine 12/22/2024 2.41 (H)  0.60 - 1.30 mg/dL Final    Standardized to IDMS reference method   • Glucose 12/22/2024 131  65 - 140 mg/dL Final    If the patient is fasting, the ADA then defines impaired fasting glucose as > 100 mg/dL and diabetes as > or equal to 123 mg/dL.   • Calcium 12/22/2024 10.0  8.4 - 10.2 mg/dL Final   • AST 12/22/2024 39  13 - 39 U/L Final   • ALT 12/22/2024 57 (H)  7 - 52 U/L Final    Specimen collection should occur prior to Sulfasalazine administration due to the potential for falsely depressed results.    • Alkaline Phosphatase 12/22/2024 62  34 - 104 U/L Final   • Total Protein 12/22/2024 8.2  6.4 - 8.4 g/dL Final   • Albumin 12/22/2024 5.1 (H)  3.5 - 5.0 g/dL Final   • Total Bilirubin 12/22/2024 1.18 (H)  0.20 - 1.00 mg/dL Final    Use of this assay is not recommended for patients undergoing treatment with eltrombopag due to the potential for falsely elevated results.  N-acetyl-p-benzoquinone imine (metabolite of Acetaminophen) will generate erroneously low results in samples for patients that have taken an overdose of Acetaminophen.   • eGFR 12/22/2024 32  ml/min/1.73sq m Final   • Lipase 12/22/2024 26  11 - 82 u/L Final   • RBC Morphology 12/22/2024 Normal   Final   • Platelet Estimate 12/22/2024 Adequate  Adequate Final         Jose Zhang MD        \"This note has been constructed using a voice recognition system.Therefore there may be syntax, spelling, and/or grammatical errors. Please call if you have any questions. \"  "

## 2025-02-04 ENCOUNTER — OFFICE VISIT (OUTPATIENT)
Dept: FAMILY MEDICINE CLINIC | Facility: CLINIC | Age: 40
End: 2025-02-04
Payer: COMMERCIAL

## 2025-02-04 VITALS
WEIGHT: 214 LBS | HEART RATE: 71 BPM | OXYGEN SATURATION: 97 % | HEIGHT: 71 IN | BODY MASS INDEX: 29.96 KG/M2 | DIASTOLIC BLOOD PRESSURE: 72 MMHG | SYSTOLIC BLOOD PRESSURE: 120 MMHG

## 2025-02-04 DIAGNOSIS — R74.8 ELEVATED LIVER ENZYMES: ICD-10-CM

## 2025-02-04 DIAGNOSIS — R79.89 ELEVATED SERUM CREATININE: ICD-10-CM

## 2025-02-04 DIAGNOSIS — E78.5 DYSLIPIDEMIA: Primary | ICD-10-CM

## 2025-02-04 DIAGNOSIS — N30.90 CYSTITIS: ICD-10-CM

## 2025-02-04 PROCEDURE — 99214 OFFICE O/P EST MOD 30 MIN: CPT | Performed by: INTERNAL MEDICINE

## 2025-02-04 NOTE — ASSESSMENT & PLAN NOTE
Repeat chemistry shows creatinine at 1.18 it was 2.41 before much better with a GFR of 80.  Will continue to monitor periodic labs.

## 2025-02-04 NOTE — ASSESSMENT & PLAN NOTE
As mentioned in HPI 2 episodes of blood in the urine diagnosed with cystitis with ESBL received antibiotics patient also had CAT scan done which showed some abnormality in the bladder area.  I have ordered the last visit ultrasound which came back normal.  Patient was seen by the urologist scheduled to have a cystoscopy done in the month of May.  Currently patient is asymptomatic recommend patient to drink more water.

## 2025-02-04 NOTE — ASSESSMENT & PLAN NOTE
Lipid profile shows cholesterol 242 triglycerides 113 HDL 56 .  Mother has a history of high cholesterol no history of coronary artery disease.  Patient admits to eating a lot of fried food cheesy foods recommend very strongly to follow strict diet no fried foods less of cheese butter 2% milk will follow with repeat lab in about 3 to 4 months time if the numbers are still coming up I may need to start him on medication.

## 2025-02-18 ENCOUNTER — OFFICE VISIT (OUTPATIENT)
Dept: FAMILY MEDICINE CLINIC | Facility: CLINIC | Age: 40
End: 2025-02-18
Payer: COMMERCIAL

## 2025-02-18 VITALS
DIASTOLIC BLOOD PRESSURE: 76 MMHG | WEIGHT: 206.4 LBS | SYSTOLIC BLOOD PRESSURE: 120 MMHG | HEIGHT: 71 IN | BODY MASS INDEX: 28.9 KG/M2 | HEART RATE: 90 BPM | OXYGEN SATURATION: 99 %

## 2025-02-18 DIAGNOSIS — R10.84 GENERALIZED ABDOMINAL PAIN: Primary | ICD-10-CM

## 2025-02-18 DIAGNOSIS — K52.9 ENTERITIS: ICD-10-CM

## 2025-02-18 DIAGNOSIS — R79.89 ELEVATED SERUM CREATININE: ICD-10-CM

## 2025-02-18 DIAGNOSIS — E78.5 DYSLIPIDEMIA: ICD-10-CM

## 2025-02-18 PROCEDURE — 99214 OFFICE O/P EST MOD 30 MIN: CPT | Performed by: INTERNAL MEDICINE

## 2025-02-18 NOTE — ASSESSMENT & PLAN NOTE
As mentioned in the generalized abdominal pain in the history of present illness patient with episodes of diarrhea with abdominal pain question related to enteritis consult GI

## 2025-02-18 NOTE — ASSESSMENT & PLAN NOTE
As mentioned in the history of present illness patient with generalized abdominal pain after eating frozen food an hour later he developed severe pain cramping followed by diarrhea pain lasted for several hours and gradually subsided history of similar incident few months back when he was in the emergency room.  Etiology of abdominal pain area needs to be determined most likely gastroenteritis but this is a second episode with severe pain we will have him seen once by the gastroenterologist also for further evaluation and treatment

## 2025-02-18 NOTE — PROGRESS NOTES
Office Visit Note  25     Salvador Leung 39 y.o. male MRN: [id number]  : 1985    Assessment:     1. Generalized abdominal pain  Assessment & Plan:  As mentioned in the history of present illness patient with generalized abdominal pain after eating frozen food an hour later he developed severe pain cramping followed by diarrhea pain lasted for several hours and gradually subsided history of similar incident few months back when he was in the emergency room.  Etiology of abdominal pain area needs to be determined most likely gastroenteritis but this is a second episode with severe pain we will have him seen once by the gastroenterologist also for further evaluation and treatment  Orders:  -     Ambulatory referral to Gastroenterology; Future  2. Dyslipidemia  Assessment & Plan:  Patient with elevated cholesterol of 242 last visit we discussed about the diet we going to repeat the labs in few months time.  3. Enteritis  Assessment & Plan:  As mentioned in the generalized abdominal pain in the history of present illness patient with episodes of diarrhea with abdominal pain question related to enteritis consult GI  4. Elevated serum creatinine  Assessment & Plan:  Last creatinine level 1.18 stable             Discussion Summary and Plan:  Today's care plan and medications were reviewed with patient in detail and all their questions answered to their satisfaction.    Chief Complaint   Patient presents with   • Follow-up   • Abdominal Pain      Subjective:  Patient is coming here for evaluation regarding abdominal pain which has developed all of a sudden after eating some frozen food on Saturday night.  After eating 1 hour later patient started having bloating sensation severe abdominal pain and subsequently had loose bowel movements the discomfort feeling lasted for a while before it started to subside.  History of similar incident several months back when he was in the emergency room evaluated and sent  home.    Patient denies symptoms of GERD no history of ulcer.  No family history of inflammatory bowel disease.    Abdominal Pain  This is a recurrent problem. The current episode started in the past 7 days. The onset quality is sudden. The problem occurs constantly. The most recent episode lasted 8 hours. The problem has been resolved. The pain is located in the generalized abdominal region. The pain is at a severity of 7/10. The pain is severe. The quality of the pain is cramping. Associated symptoms include diarrhea. Pertinent negatives include no arthralgias, dysuria, fever, hematuria or vomiting. The pain is aggravated by eating. The pain is relieved by Nothing. He has tried nothing for the symptoms. There is no history of abdominal surgery, irritable bowel syndrome or PUD.       The following portions of the patient's history were reviewed and updated as appropriate: allergies, current medications, past family history, past medical history, past social history, past surgical history and problem list.    Review of Systems   Constitutional:  Negative for chills and fever.   HENT:  Negative for ear pain and sore throat.    Eyes:  Negative for pain and visual disturbance.   Respiratory:  Negative for cough and shortness of breath.    Cardiovascular:  Negative for chest pain and palpitations.   Gastrointestinal:  Positive for abdominal pain and diarrhea. Negative for vomiting.   Genitourinary:  Negative for dysuria and hematuria.   Musculoskeletal:  Negative for arthralgias and back pain.   Skin:  Negative for color change and rash.   Neurological:  Negative for seizures and syncope.   All other systems reviewed and are negative.        Historical Information   Patient Active Problem List   Diagnosis   • Enteritis   • Cystitis   • Elevated serum creatinine   • Elevated liver enzymes   • Dyslipidemia   • Generalized abdominal pain     History reviewed. No pertinent past medical history.  Past Surgical History:  "  Procedure Laterality Date   • KNEE SURGERY Right      Social History     Substance and Sexual Activity   Alcohol Use Yes    Comment: socially     Social History     Substance and Sexual Activity   Drug Use Never     Social History     Tobacco Use   Smoking Status Never   Smokeless Tobacco Never     Family History   Problem Relation Age of Onset   • Clotting disorder Father    • Bone cancer Paternal Grandfather      Health Maintenance Due   Topic   • HIV Screening    • Annual Physical    • DTaP,Tdap,and Td Vaccines (1 - Tdap)   • Influenza Vaccine (1)   • COVID-19 Vaccine (1 - 2024-25 season)      Meds/Allergies     No current outpatient medications on file.      Objective:    Vitals:   /76 (BP Location: Right arm, Patient Position: Sitting, Cuff Size: Standard)   Pulse 90   Ht 5' 11\" (1.803 m)   Wt 93.6 kg (206 lb 6.4 oz)   SpO2 99%   BMI 28.79 kg/m²   Body mass index is 28.79 kg/m².  Vitals:    02/18/25 1330   Weight: 93.6 kg (206 lb 6.4 oz)       Physical Exam  Vitals and nursing note reviewed.   Constitutional:       Appearance: Normal appearance.   Cardiovascular:      Rate and Rhythm: Normal rate and regular rhythm.      Heart sounds: Normal heart sounds.   Pulmonary:      Effort: Pulmonary effort is normal.      Breath sounds: Normal breath sounds.   Abdominal:      General: Bowel sounds are normal.      Tenderness: There is no abdominal tenderness. There is no right CVA tenderness or left CVA tenderness.   Musculoskeletal:      Right lower leg: No edema.      Left lower leg: No edema.   Skin:     General: Skin is warm and dry.   Neurological:      General: No focal deficit present.      Mental Status: He is alert.         Lab Review   Office Visit on 01/27/2025   Component Date Value Ref Range Status   • Urine Culture 01/27/2025 10,000-19,000 cfu/ml   Final    Mixed Contaminants X3   • Color, UA 01/27/2025 Light Yellow   Final   • Clarity, UA 01/27/2025 Clear   Final   • Specific [place], UA " 01/27/2025 1.012  1.003 - 1.030 Final   • pH, UA 01/27/2025 7.0  4.5, 5.0, 5.5, 6.0, 6.5, 7.0, 7.5, 8.0 Final   • Leukocytes, UA 01/27/2025 Negative  Negative Final   • Nitrite, UA 01/27/2025 Negative  Negative Final   • Protein, UA 01/27/2025 Negative  Negative mg/dl Final   • Glucose, UA 01/27/2025 Negative  Negative mg/dl Final   • Ketones, UA 01/27/2025 Negative  Negative mg/dl Final   • Urobilinogen, UA 01/27/2025 <2.0  <2.0 mg/dl mg/dl Final   • Bilirubin, UA 01/27/2025 Negative  Negative Final   • Occult Blood, UA 01/27/2025 Negative  Negative Final   • RBC, UA 01/27/2025 1-2  None Seen, 1-2 /hpf Final   • WBC, UA 01/27/2025 1-2  None Seen, 1-2 /hpf Final   • Epithelial Cells 01/27/2025 Occasional  None Seen, Occasional /hpf Final   • Bacteria, UA 01/27/2025 None Seen  None Seen, Occasional /hpf Final   • MUCUS THREADS 01/27/2025 Occasional (A)  None Seen Final   Orders Only on 01/18/2025   Component Date Value Ref Range Status   • White Blood Cell Count 01/18/2025 4.5  3.4 - 10.8 x10E3/uL Final   • Red Blood Cell Count 01/18/2025 4.77  4.14 - 5.80 x10E6/uL Final   • Hemoglobin 01/18/2025 14.8  13.0 - 17.7 g/dL Final   • HCT 01/18/2025 45.7  37.5 - 51.0 % Final   • MCV 01/18/2025 96  79 - 97 fL Final   • MCH 01/18/2025 31.0  26.6 - 33.0 pg Final   • MCHC 01/18/2025 32.4  31.5 - 35.7 g/dL Final   • RDW 01/18/2025 12.5  11.6 - 15.4 % Final   • Platelet Count 01/18/2025 293  150 - 450 x10E3/uL Final   • Neutrophils 01/18/2025 44  Not Estab. % Final   • Lymphocytes 01/18/2025 39  Not Estab. % Final   • Monocytes 01/18/2025 11  Not Estab. % Final   • Eosinophils 01/18/2025 5  Not Estab. % Final   • Basophils PCT 01/18/2025 1  Not Estab. % Final   • Neutrophils (Absolute) 01/18/2025 2.0  1.4 - 7.0 x10E3/uL Final   • Lymphocytes (Absolute) 01/18/2025 1.8  0.7 - 3.1 x10E3/uL Final   • Monocytes (Absolute) 01/18/2025 0.5  0.1 - 0.9 x10E3/uL Final   • Eosinophils (Absolute) 01/18/2025 0.2  0.0 - 0.4 x10E3/uL Final   •  Basophils ABS 01/18/2025 0.0  0.0 - 0.2 x10E3/uL Final   • Immature Granulocytes 01/18/2025 0  Not Estab. % Final   • Immature Granulocytes (Absolute) 01/18/2025 0.0  0.0 - 0.1 x10E3/uL Final   • Glucose, Random 01/18/2025 98  70 - 99 mg/dL Final   • BUN 01/18/2025 9  6 - 24 mg/dL Final   • Creatinine 01/18/2025 1.18  0.76 - 1.27 mg/dL Final   • eGFR 01/18/2025 80  >59 mL/min/1.73 Final   • SL AMB BUN/CREATININE RATIO 01/18/2025 8 (L)  9 - 20 Final   • Sodium 01/18/2025 142  134 - 144 mmol/L Final   • Potassium 01/18/2025 4.3  3.5 - 5.2 mmol/L Final   • Chloride 01/18/2025 103  96 - 106 mmol/L Final   • CO2 01/18/2025 25  20 - 29 mmol/L Final   • CALCIUM 01/18/2025 9.4  8.7 - 10.2 mg/dL Final   • Protein, Total 01/18/2025 7.1  6.0 - 8.5 g/dL Final   • Albumin 01/18/2025 4.3  4.1 - 5.1 g/dL Final   • Globulin, Total 01/18/2025 2.8  1.5 - 4.5 g/dL Final   • TOTAL BILIRUBIN 01/18/2025 1.0  0.0 - 1.2 mg/dL Final   • Alk Phos Isoenzymes 01/18/2025 68  44 - 121 IU/L Final   • AST 01/18/2025 21  0 - 40 IU/L Final   • ALT 01/18/2025 17  0 - 44 IU/L Final   • Specific Gravity 01/18/2025      >=1.030 (A)  1.005 - 1.030 Final   • Ph 01/18/2025 6.0  5.0 - 7.5 Final   • Color UA 01/18/2025 Yellow  Yellow Final   • Urine Appearance 01/18/2025 Cloudy (A)  Clear Final   • Leukocyte Esterase 01/18/2025 1+ (A)  Negative Final   • Protein 01/18/2025 1+ (A)  Negative/Trace Final   • Glucose, 24 HR Urine 01/18/2025 Negative  Negative Final   • Ketone, Urine 01/18/2025 Negative  Negative Final   • Blood, Urine 01/18/2025 Trace (A)  Negative Final   • Bilirubin, Urine 01/18/2025 Negative  Negative Final   • Urobilinogen Urine 01/18/2025 0.2  0.2 - 1.0 mg/dL Final   • Nitrites Urine 01/18/2025 Positive (A)  Negative Final   • Microscopic Examination 01/18/2025 See below:   Final    Microscopic was indicated and was performed.   • SL AMB WBC, URINE 01/18/2025 >30 (A)  0 - 5 /hpf Final   • RBC, Urine 01/18/2025 0-2  0 - 2 /hpf Final   •  Epithelial Cells (non renal) 01/18/2025 0-10  0 - 10 /hpf Final   • Casts 01/18/2025 None seen  None seen /lpf Final   • Bacteria, Urine 01/18/2025 Many (A)  None seen/Few Final   • Cholesterol, Total 01/18/2025 242 (H)  100 - 199 mg/dL Final   • Triglycerides 01/18/2025 113  0 - 149 mg/dL Final   • HDL 01/18/2025 56  >39 mg/dL Final   • VLDL Cholesterol Calculated 01/18/2025 20  5 - 40 mg/dL Final   • LDL Calculated 01/18/2025 166 (H)  0 - 99 mg/dL Final   • Hemoglobin A1C 01/18/2025 5.5  4.8 - 5.6 % Final    Comment:          Prediabetes: 5.7 - 6.4           Diabetes: >6.4           Glycemic control for adults with diabetes: <7.0     • TSH 01/18/2025 1.540  0.450 - 4.500 uIU/mL Final   • 25-HYDROXY VIT D 01/18/2025 4.8 (L)  30.0 - 100.0 ng/mL Final    Comment: Vitamin D deficiency has been defined by the [place] [employer]  [employer] and an Endocrine Society practice guideline as a  level of serum 25-OH vitamin D less than 20 ng/mL (1,2).  The Endocrine Society went on to further define vitamin D  insufficiency as a level between 21 and 29 ng/mL (2).  1. IOM ([employer]). 2010. Dietary reference     intakes for calcium and D. [place]: The     National Academies Press.  2. Ricki MF, Dileep NC, Leroy MÁRQUEZ, et al.     Evaluation, treatment, and prevention of vitamin D     deficiency: an Endocrine Society clinical practice     guideline. JCEM. 2011 Jul; 96(7):1911-30.     • Prostate Specific Antigen Total 01/18/2025 0.9  0.0 - 4.0 ng/mL Final    Comment: Roche ECLIA methodology.  According to the American Urological Association, Serum PSA should  decrease and remain at undetectable levels after radical  prostatectomy. The AUA defines biochemical recurrence as an initial  PSA value 0.2 ng/mL or greater followed by a subsequent confirmatory  PSA value 0.2 ng/mL or greater.  Values obtained with different assay methods or kits cannot be used  interchangeably. Results cannot be interpreted as absolute  evidence  of the presence or absence of malignant disease.     Office Visit on 12/31/2024   Component Date Value Ref Range Status   • HEP C AB 12/31/2024 Non Reactive  Non Reactive Final    Comment: HCV antibody alone does not differentiate between previously  resolved infection and active infection. Equivocal and Reactive  HCV antibody results should be followed up with an HCV RNA test  to support the diagnosis of active HCV infection.     • HBsAg Screen 12/31/2024 Negative  Negative Final   • Hepatitis B Surface Ab Quant 12/31/2024 133.0  Immunity>10 mIU/mL Final    Comment:   Status of Immunity                     Anti-HBs Level    ------------------                     --------------  Inconsistent with Immunity                  0.0 - 10.0  Consistent with Immunity                         >10.0     Admission on 12/22/2024, Discharged on 12/22/2024   Component Date Value Ref Range Status   • WBC 12/22/2024 15.19 (H)  4.31 - 10.16 Thousand/uL Final   • RBC 12/22/2024 5.36  3.88 - 5.62 Million/uL Final   • Hemoglobin 12/22/2024 16.4  12.0 - 17.0 g/dL Final   • Hematocrit 12/22/2024 49.2  36.5 - 49.3 % Final   • MCV 12/22/2024 92  82 - 98 fL Final   • MCH 12/22/2024 30.6  26.8 - 34.3 pg Final   • MCHC 12/22/2024 33.3  31.4 - 37.4 g/dL Final   • RDW 12/22/2024 12.6  11.6 - 15.1 % Final   • MPV 12/22/2024 8.9  8.9 - 12.7 fL Final   • Platelets 12/22/2024 308  149 - 390 Thousands/uL Final   • nRBC 12/22/2024 0  /100 WBCs Final    This is an appended report.  These results have been appended to a previously preliminary verified report.   • Segmented % 12/22/2024 96 (H)  43 - 75 % Final   • Immature Grans % 12/22/2024 0  0 - 2 % Final   • Lymphocytes % 12/22/2024 1 (L)  14 - 44 % Final   • Monocytes % 12/22/2024 3 (L)  4 - 12 % Final   • Eosinophils Relative 12/22/2024 0  0 - 6 % Final   • Basophils Relative 12/22/2024 0  0 - 1 % Final   • Absolute Neutrophils 12/22/2024 14.46 (H)  1.85 - 7.62 Thousands/µL Final   •  Absolute Immature Grans 12/22/2024 0.06  0.00 - 0.20 Thousand/uL Final   • Absolute Lymphocytes 12/22/2024 0.22 (L)  0.60 - 4.47 Thousands/µL Final   • Absolute Monocytes 12/22/2024 0.43  0.17 - 1.22 Thousand/µL Final   • Eosinophils Absolute 12/22/2024 0.00  0.00 - 0.61 Thousand/µL Final   • Basophils Absolute 12/22/2024 0.02  0.00 - 0.10 Thousands/µL Final   • Sodium 12/22/2024 138  135 - 147 mmol/L Final   • Potassium 12/22/2024 4.7  3.5 - 5.3 mmol/L Final   • Chloride 12/22/2024 103  96 - 108 mmol/L Final   • CO2 12/22/2024 24  21 - 32 mmol/L Final   • ANION GAP 12/22/2024 11  4 - 13 mmol/L Final   • BUN 12/22/2024 28 (H)  5 - 25 mg/dL Final   • Creatinine 12/22/2024 2.41 (H)  0.60 - 1.30 mg/dL Final    Standardized to IDMS reference method   • Glucose 12/22/2024 131  65 - 140 mg/dL Final    If the patient is fasting, the ADA then defines impaired fasting glucose as > 100 mg/dL and diabetes as > or equal to 123 mg/dL.   • Calcium 12/22/2024 10.0  8.4 - 10.2 mg/dL Final   • AST 12/22/2024 39  13 - 39 U/L Final   • ALT 12/22/2024 57 (H)  7 - 52 U/L Final    Specimen collection should occur prior to Sulfasalazine administration due to the potential for falsely depressed results.    • Alkaline Phosphatase 12/22/2024 62  34 - 104 U/L Final   • Total Protein 12/22/2024 8.2  6.4 - 8.4 g/dL Final   • Albumin 12/22/2024 5.1 (H)  3.5 - 5.0 g/dL Final   • Total Bilirubin 12/22/2024 1.18 (H)  0.20 - 1.00 mg/dL Final    Use of this assay is not recommended for patients undergoing treatment with eltrombopag due to the potential for falsely elevated results.  N-acetyl-p-benzoquinone imine (metabolite of Acetaminophen) will generate erroneously low results in samples for patients that have taken an overdose of Acetaminophen.   • eGFR 12/22/2024 32  ml/min/1.73sq m Final   • Lipase 12/22/2024 26  11 - 82 u/L Final   • RBC Morphology 12/22/2024 Normal   Final   • Platelet Estimate 12/22/2024 Adequate  Adequate Final  "        Jose Zhang MD        \"This note has been constructed using a voice recognition system.Therefore there may be syntax, spelling, and/or grammatical errors. Please call if you have any questions. \"  "

## 2025-02-18 NOTE — ASSESSMENT & PLAN NOTE
Patient with elevated cholesterol of 242 last visit we discussed about the diet we going to repeat the labs in few months time.

## 2025-02-25 ENCOUNTER — TELEPHONE (OUTPATIENT)
Age: 40
End: 2025-02-25

## 2025-02-25 ENCOUNTER — OFFICE VISIT (OUTPATIENT)
Age: 40
End: 2025-02-25
Payer: COMMERCIAL

## 2025-02-25 VITALS
HEIGHT: 71 IN | DIASTOLIC BLOOD PRESSURE: 68 MMHG | BODY MASS INDEX: 26.88 KG/M2 | HEART RATE: 63 BPM | SYSTOLIC BLOOD PRESSURE: 109 MMHG | WEIGHT: 192 LBS

## 2025-02-25 DIAGNOSIS — R10.84 GENERALIZED ABDOMINAL PAIN: Primary | ICD-10-CM

## 2025-02-25 DIAGNOSIS — R19.7 DIARRHEA, UNSPECIFIED TYPE: ICD-10-CM

## 2025-02-25 DIAGNOSIS — R10.84 GENERALIZED POSTPRANDIAL ABDOMINAL PAIN: ICD-10-CM

## 2025-02-25 PROCEDURE — 99204 OFFICE O/P NEW MOD 45 MIN: CPT | Performed by: NURSE PRACTITIONER

## 2025-02-25 RX ORDER — SODIUM CHLORIDE, SODIUM LACTATE, POTASSIUM CHLORIDE, CALCIUM CHLORIDE 600; 310; 30; 20 MG/100ML; MG/100ML; MG/100ML; MG/100ML
125 INJECTION, SOLUTION INTRAVENOUS CONTINUOUS
OUTPATIENT
Start: 2025-02-25

## 2025-02-25 NOTE — TELEPHONE ENCOUNTER
Scheduled date of EGD/colonoscopy (as of today):  Physician performing EGD/colonoscopy:Dr. Ritter  Location of EGD/colonoscopy:[facility]  Desired bowel prep reviewed with patient:Araceli  Instructions reviewed with patient by:ANGELA  Clearances:  NA

## 2025-02-25 NOTE — PROGRESS NOTES
Name: Salvador Leung      : 1985      MRN: [id number]  Encounter Provider: RAFI Camara  Encounter Date: 2025   Encounter department: [facility]  :  Assessment & Plan  Generalized abdominal pain  39 y.o. male with past medical history of dyslipidemia, cystitis, enteritis who presents to office as consult. Patient reported he has been experiencing episodes of diarrhea associated with abdominal pain which occur on an intermittent basis.  Patient's first episode occurred  where he experienced generalized abdominal pain associated with nausea, vomiting, and diarrhea. Patient went to the ED for further evaluation.  Lab work at that time did show positive leukocytosis CT of the abdomen and pelvis done  showed findings suggestive of mild enteritis.  Patient had reoccurrence of episode approximately 1 week ago.  Second episode was not associated with nausea and vomiting but he did report eating fatty foods prior to episode occurring.  Generalized abdominal pain associated with diarrhea may be secondary to biliary etiology, lesion, ulceration, esophagitis gastritis, post infectious IBD, etc.  Orders:    Ambulatory referral to Gastroenterology    US abdomen limited; Future    polyethylene glycol (GOLYTELY) 4000 mL solution; Take 4,000 mL by mouth once for 1 dose Take as directed by office prior to procedure    Colonoscopy; Future    EGD; Future  If ultrasound of abdomen negative for gallbladder etiology recommend HIDA scan to check for biliary dyskinesis   Diarrhea, unspecified type  See above  Orders:    polyethylene glycol (GOLYTELY) 4000 mL solution; Take 4,000 mL by mouth once for 1 dose Take as directed by office prior to procedure    Colonoscopy; Future    Generalized postprandial abdominal pain  Patient reported intermittent episodes of generalized postprandial abdominal pain which has been occurring after eating fatty meals.  CT of abdomen and  pelvis done 12/22 suggestive of enteritis.  Generalized postprandial abdominal pain may be secondary to biliary etiology, ulceration, lesion, H. pylori,, or esophagitis.  Orders:    EGD; Future  -Will hold off on starting medication results of EGD are intermittent and not with acid reflux heartburn nausea or vomiting.    Follow up after procedure      History of Present Illness   HPI  Salvador Leung is a 39 y.o. male with past medical history of dyslipidemia, cystitis, enteritis who presents to office as new patient.  Patient reported he has been experiencing episodes of diarrhea associated with abdominal pain which occur on an intermittent basis.  Patient's first episode occurred 12/22 where he experienced generalized abdominal pain associated with nausea vomiting and diarrhea. Patient stated that he did have [employer] and frozen burgers.  Patient went to the ED for further evaluation.  Lab work at that time did show positive leukocytosis CT of the abdomen and pelvis done 12/22 showed findings suggestive of mild enteritis.  Diverticulosis without evidence of diverticulitis or colitis.  Patient reported at that time his symptoms lasted for approximately 5 days and then resolved.  Patient had reoccurrence of episode approximately 1 week ago.  Patient again reported that at that time he did eat [employer] and frozen lasagna meal.  Patient reported he then began to experience generalized abdominal pain associated with diarrhea.  With the second episode 1 week ago patient did not have any nausea or vomiting.  Patient reports that during these episodes that the pain will be generalized throughout the whole abdomen and then later will become more localized and will alternate from the left and right upper abdomen. Patient reports that second episode lasted approximately 4 to 5 days.      Patient denies acid reflux, heartburn, dysphagia, blood in stool, blood from rectal area, or black tarry stool.  Bowel patterns are  "regular except for these intermittent episodes of diarrhea that have been lasting for approximately 4 to 5 days.  Abdomen exam benign no abdominal tenderness or guarding.    Patient has never had an EGD or colonoscopy.  Patient does not take NSAIDs.  Patient drinks alcohol socially.  Patient does not smoke.  Denies illicit drug use.  No family history of gastric or colon cancer.      Review of Systems   Constitutional:  Negative for chills and fever.   HENT:  Negative for ear pain and sore throat.    Eyes:  Negative for pain and visual disturbance.   Respiratory:  Negative for cough and shortness of breath.    Cardiovascular:  Negative for chest pain and palpitations.   Gastrointestinal:  Positive for abdominal pain and diarrhea. Negative for vomiting.   Genitourinary:  Negative for dysuria and hematuria.   Musculoskeletal:  Negative for arthralgias and back pain.   Skin:  Negative for color change and rash.   Neurological:  Negative for seizures and syncope.   All other systems reviewed and are negative.         Objective   /68 (Patient Position: Sitting, Cuff Size: Standard)   Pulse 63   Ht 5' 11\" (1.803 m)   Wt 87.1 kg (192 lb)   BMI 26.78 kg/m²      Physical Exam  Vitals and nursing note reviewed.   Constitutional:       General: He is not in acute distress.     Appearance: He is well-developed.   HENT:      Head: Normocephalic and atraumatic.   Eyes:      Conjunctiva/sclera: Conjunctivae normal.   Cardiovascular:      Rate and Rhythm: Normal rate and regular rhythm.      Pulses: Normal pulses.      Heart sounds: Normal heart sounds. No murmur heard.  Pulmonary:      Effort: Pulmonary effort is normal. No respiratory distress.      Breath sounds: Normal breath sounds. No stridor. No wheezing, rhonchi or rales.   Abdominal:      General: Bowel sounds are normal. There is no distension.      Palpations: Abdomen is soft. There is no mass.      Tenderness: There is no abdominal tenderness. There is no " guarding or rebound.   Musculoskeletal:         General: No swelling.      Cervical back: Neck supple.      Right lower leg: No edema.      Left lower leg: No edema.   Skin:     General: Skin is warm and dry.      Capillary Refill: Capillary refill takes less than 2 seconds.      Coloration: Skin is not jaundiced or pale.   Neurological:      Mental Status: He is alert and oriented to person, place, and time.   Psychiatric:         Mood and Affect: Mood normal.

## 2025-02-25 NOTE — ASSESSMENT & PLAN NOTE
See above  Orders:    polyethylene glycol (GOLYTELY) 4000 mL solution; Take 4,000 mL by mouth once for 1 dose Take as directed by office prior to procedure    Colonoscopy; Future

## 2025-02-25 NOTE — ASSESSMENT & PLAN NOTE
39 y.o. male with past medical history of dyslipidemia, cystitis, enteritis who presents to office as consult. Patient reported he has been experiencing episodes of diarrhea associated with abdominal pain which occur on an intermittent basis.  Patient's first episode occurred 12/22 where he experienced generalized abdominal pain associated with nausea, vomiting, and diarrhea. Patient went to the ED for further evaluation.  Lab work at that time did show positive leukocytosis CT of the abdomen and pelvis done 12/22 showed findings suggestive of mild enteritis.  Patient had reoccurrence of episode approximately 1 week ago.  Second episode was not associated with nausea and vomiting but he did report eating fatty foods prior to episode occurring.  Generalized abdominal pain associated with diarrhea may be secondary to biliary etiology, lesion, ulceration, esophagitis gastritis, post infectious IBD, etc.  Orders:    Ambulatory referral to Gastroenterology    US abdomen limited; Future    polyethylene glycol (GOLYTELY) 4000 mL solution; Take 4,000 mL by mouth once for 1 dose Take as directed by office prior to procedure    Colonoscopy; Future    EGD; Future  If ultrasound of abdomen negative for gallbladder etiology recommend HIDA scan to check for biliary dyskinesis

## 2025-02-25 NOTE — ASSESSMENT & PLAN NOTE
Patient reported intermittent episodes of generalized postprandial abdominal pain which has been occurring after eating fatty meals.  CT of abdomen and pelvis done 12/22 suggestive of enteritis.  Generalized postprandial abdominal pain may be secondary to biliary etiology, ulceration, lesion, H. pylori,, or esophagitis.  Orders:    EGD; Future  -Will hold off on starting medication results of EGD are intermittent and not with acid reflux heartburn nausea or vomiting.    Follow up after procedure

## 2025-02-25 NOTE — PATIENT INSTRUCTIONS
Diet recommendations limit caffeine may have 1-2 8 ounce cups of coffee daily avoid spicy fatty foods, and limit carbonated beverages.

## 2025-02-25 NOTE — H&P (VIEW-ONLY)
Name: Salvador Leung      : 1985      MRN: [id number]  Encounter Provider: RAFI Camara  Encounter Date: 2025   Encounter department: [facility]  :  Assessment & Plan  Generalized abdominal pain  39 y.o. male with past medical history of dyslipidemia, cystitis, enteritis who presents to office as consult. Patient reported he has been experiencing episodes of diarrhea associated with abdominal pain which occur on an intermittent basis.  Patient's first episode occurred  where he experienced generalized abdominal pain associated with nausea, vomiting, and diarrhea. Patient went to the ED for further evaluation.  Lab work at that time did show positive leukocytosis CT of the abdomen and pelvis done  showed findings suggestive of mild enteritis.  Patient had reoccurrence of episode approximately 1 week ago.  Second episode was not associated with nausea and vomiting but he did report eating fatty foods prior to episode occurring.  Generalized abdominal pain associated with diarrhea may be secondary to biliary etiology, lesion, ulceration, esophagitis gastritis, post infectious IBD, etc.  Orders:    Ambulatory referral to Gastroenterology    US abdomen limited; Future    polyethylene glycol (GOLYTELY) 4000 mL solution; Take 4,000 mL by mouth once for 1 dose Take as directed by office prior to procedure    Colonoscopy; Future    EGD; Future  If ultrasound of abdomen negative for gallbladder etiology recommend HIDA scan to check for biliary dyskinesis   Diarrhea, unspecified type  See above  Orders:    polyethylene glycol (GOLYTELY) 4000 mL solution; Take 4,000 mL by mouth once for 1 dose Take as directed by office prior to procedure    Colonoscopy; Future    Generalized postprandial abdominal pain  Patient reported intermittent episodes of generalized postprandial abdominal pain which has been occurring after eating fatty meals.  CT of abdomen and  pelvis done 12/22 suggestive of enteritis.  Generalized postprandial abdominal pain may be secondary to biliary etiology, ulceration, lesion, H. pylori,, or esophagitis.  Orders:    EGD; Future  -Will hold off on starting medication results of EGD are intermittent and not with acid reflux heartburn nausea or vomiting.    Follow up after procedure      History of Present Illness   HPI  Salvador Leung is a 39 y.o. male with past medical history of dyslipidemia, cystitis, enteritis who presents to office as new patient.  Patient reported he has been experiencing episodes of diarrhea associated with abdominal pain which occur on an intermittent basis.  Patient's first episode occurred 12/22 where he experienced generalized abdominal pain associated with nausea vomiting and diarrhea. Patient stated that he did have [employer] and frozen burgers.  Patient went to the ED for further evaluation.  Lab work at that time did show positive leukocytosis CT of the abdomen and pelvis done 12/22 showed findings suggestive of mild enteritis.  Diverticulosis without evidence of diverticulitis or colitis.  Patient reported at that time his symptoms lasted for approximately 5 days and then resolved.  Patient had reoccurrence of episode approximately 1 week ago.  Patient again reported that at that time he did eat [employer] and frozen lasagna meal.  Patient reported he then began to experience generalized abdominal pain associated with diarrhea.  With the second episode 1 week ago patient did not have any nausea or vomiting.  Patient reports that during these episodes that the pain will be generalized throughout the whole abdomen and then later will become more localized and will alternate from the left and right upper abdomen. Patient reports that second episode lasted approximately 4 to 5 days.      Patient denies acid reflux, heartburn, dysphagia, blood in stool, blood from rectal area, or black tarry stool.  Bowel patterns are  "regular except for these intermittent episodes of diarrhea that have been lasting for approximately 4 to 5 days.  Abdomen exam benign no abdominal tenderness or guarding.    Patient has never had an EGD or colonoscopy.  Patient does not take NSAIDs.  Patient drinks alcohol socially.  Patient does not smoke.  Denies illicit drug use.  No family history of gastric or colon cancer.      Review of Systems   Constitutional:  Negative for chills and fever.   HENT:  Negative for ear pain and sore throat.    Eyes:  Negative for pain and visual disturbance.   Respiratory:  Negative for cough and shortness of breath.    Cardiovascular:  Negative for chest pain and palpitations.   Gastrointestinal:  Positive for abdominal pain and diarrhea. Negative for vomiting.   Genitourinary:  Negative for dysuria and hematuria.   Musculoskeletal:  Negative for arthralgias and back pain.   Skin:  Negative for color change and rash.   Neurological:  Negative for seizures and syncope.   All other systems reviewed and are negative.         Objective   /68 (Patient Position: Sitting, Cuff Size: Standard)   Pulse 63   Ht 5' 11\" (1.803 m)   Wt 87.1 kg (192 lb)   BMI 26.78 kg/m²      Physical Exam  Vitals and nursing note reviewed.   Constitutional:       General: He is not in acute distress.     Appearance: He is well-developed.   HENT:      Head: Normocephalic and atraumatic.   Eyes:      Conjunctiva/sclera: Conjunctivae normal.   Cardiovascular:      Rate and Rhythm: Normal rate and regular rhythm.      Pulses: Normal pulses.      Heart sounds: Normal heart sounds. No murmur heard.  Pulmonary:      Effort: Pulmonary effort is normal. No respiratory distress.      Breath sounds: Normal breath sounds. No stridor. No wheezing, rhonchi or rales.   Abdominal:      General: Bowel sounds are normal. There is no distension.      Palpations: Abdomen is soft. There is no mass.      Tenderness: There is no abdominal tenderness. There is no " guarding or rebound.   Musculoskeletal:         General: No swelling.      Cervical back: Neck supple.      Right lower leg: No edema.      Left lower leg: No edema.   Skin:     General: Skin is warm and dry.      Capillary Refill: Capillary refill takes less than 2 seconds.      Coloration: Skin is not jaundiced or pale.   Neurological:      Mental Status: He is alert and oriented to person, place, and time.   Psychiatric:         Mood and Affect: Mood normal.

## 2025-03-04 ENCOUNTER — HOSPITAL ENCOUNTER (OUTPATIENT)
Dept: RADIOLOGY | Facility: HOSPITAL | Age: 40
Discharge: HOME | End: 2025-03-04
Payer: COMMERCIAL

## 2025-03-04 DIAGNOSIS — R10.84 GENERALIZED ABDOMINAL PAIN: ICD-10-CM

## 2025-03-04 PROCEDURE — 76705 ECHO EXAM OF ABDOMEN: CPT

## 2025-03-05 ENCOUNTER — RESULTS FOLLOW-UP (OUTPATIENT)
Dept: GASTROENTEROLOGY | Facility: CLINIC | Age: 40
End: 2025-03-05

## 2025-03-10 ENCOUNTER — ANESTHESIA EVENT (OUTPATIENT)
Dept: ANESTHESIOLOGY | Facility: HOSPITAL | Age: 40
End: 2025-03-10

## 2025-03-10 ENCOUNTER — ANESTHESIA (OUTPATIENT)
Dept: ANESTHESIOLOGY | Facility: HOSPITAL | Age: 40
End: 2025-03-10

## 2025-03-19 ENCOUNTER — TELEPHONE (OUTPATIENT)
Age: 40
End: 2025-03-19

## 2025-03-19 NOTE — TELEPHONE ENCOUNTER
Spoke with pt confirming procedure for 3/27. He has his  and his prep. He will be called Friday with his arrival time.

## 2025-03-24 ENCOUNTER — HOSPITAL ENCOUNTER (OUTPATIENT)
Dept: GASTROENTEROLOGY | Facility: AMBULARY SURGERY CENTER | Age: 40
Setting detail: OUTPATIENT SURGERY
Discharge: HOME | End: 2025-03-24
Attending: INTERNAL MEDICINE
Payer: COMMERCIAL

## 2025-03-24 ENCOUNTER — ANESTHESIA EVENT (OUTPATIENT)
Dept: GASTROENTEROLOGY | Facility: AMBULARY SURGERY CENTER | Age: 40
End: 2025-03-24
Payer: COMMERCIAL

## 2025-03-24 VITALS
SYSTOLIC BLOOD PRESSURE: 108 MMHG | DIASTOLIC BLOOD PRESSURE: 68 MMHG | TEMPERATURE: 96.8 F | RESPIRATION RATE: 16 BRPM | OXYGEN SATURATION: 99 % | HEART RATE: 72 BPM

## 2025-03-24 DIAGNOSIS — R10.84 GENERALIZED ABDOMINAL PAIN: ICD-10-CM

## 2025-03-24 DIAGNOSIS — R10.84 GENERALIZED POSTPRANDIAL ABDOMINAL PAIN: ICD-10-CM

## 2025-03-24 DIAGNOSIS — R19.7 DIARRHEA, UNSPECIFIED TYPE: ICD-10-CM

## 2025-03-24 PROCEDURE — 43239 EGD BIOPSY SINGLE/MULTIPLE: CPT | Performed by: INTERNAL MEDICINE

## 2025-03-24 PROCEDURE — 45385 COLONOSCOPY W/LESION REMOVAL: CPT | Performed by: INTERNAL MEDICINE

## 2025-03-24 PROCEDURE — 88342 IMHCHEM/IMCYTCHM 1ST ANTB: CPT | Performed by: PATHOLOGY

## 2025-03-24 PROCEDURE — 45380 COLONOSCOPY AND BIOPSY: CPT | Performed by: INTERNAL MEDICINE

## 2025-03-24 PROCEDURE — 88305 TISSUE EXAM BY PATHOLOGIST: CPT | Performed by: PATHOLOGY

## 2025-03-24 RX ORDER — GLYCOPYRROLATE 0.2 MG/ML
INJECTION INTRAMUSCULAR; INTRAVENOUS AS NEEDED
Status: DISCONTINUED | OUTPATIENT
Start: 2025-03-24 | End: 2025-03-24

## 2025-03-24 RX ORDER — LIDOCAINE HYDROCHLORIDE 10 MG/ML
INJECTION, SOLUTION EPIDURAL; INFILTRATION; INTRACAUDAL; PERINEURAL AS NEEDED
Status: DISCONTINUED | OUTPATIENT
Start: 2025-03-24 | End: 2025-03-24

## 2025-03-24 RX ORDER — PROPOFOL 10 MG/ML
INJECTION, EMULSION INTRAVENOUS CONTINUOUS PRN
Status: DISCONTINUED | OUTPATIENT
Start: 2025-03-24 | End: 2025-03-24

## 2025-03-24 RX ORDER — PROPOFOL 10 MG/ML
INJECTION, EMULSION INTRAVENOUS AS NEEDED
Status: DISCONTINUED | OUTPATIENT
Start: 2025-03-24 | End: 2025-03-24

## 2025-03-24 RX ORDER — ONDANSETRON 2 MG/ML
4 INJECTION INTRAMUSCULAR; INTRAVENOUS ONCE AS NEEDED
Status: CANCELLED | OUTPATIENT
Start: 2025-03-24

## 2025-03-24 RX ORDER — SODIUM CHLORIDE, SODIUM LACTATE, POTASSIUM CHLORIDE, CALCIUM CHLORIDE 600; 310; 30; 20 MG/100ML; MG/100ML; MG/100ML; MG/100ML
125 INJECTION, SOLUTION INTRAVENOUS CONTINUOUS
Status: DISCONTINUED | OUTPATIENT
Start: 2025-03-24 | End: 2025-03-28 | Stop reason: HOSPADM

## 2025-03-24 RX ADMIN — PROPOFOL 50 MG: 10 INJECTION, EMULSION INTRAVENOUS at 10:05

## 2025-03-24 RX ADMIN — PROPOFOL 50 MG: 10 INJECTION, EMULSION INTRAVENOUS at 10:10

## 2025-03-24 RX ADMIN — LIDOCAINE HYDROCHLORIDE 50 MG: 10 INJECTION, SOLUTION EPIDURAL; INFILTRATION; INTRACAUDAL; PERINEURAL at 09:59

## 2025-03-24 RX ADMIN — PROPOFOL 130 MCG/KG/MIN: 10 INJECTION, EMULSION INTRAVENOUS at 10:10

## 2025-03-24 RX ADMIN — GLYCOPYRROLATE 0.1 MG: 0.2 INJECTION, SOLUTION INTRAMUSCULAR; INTRAVENOUS at 09:58

## 2025-03-24 RX ADMIN — PROPOFOL 50 MG: 10 INJECTION, EMULSION INTRAVENOUS at 10:02

## 2025-03-24 RX ADMIN — PROPOFOL 50 MG: 10 INJECTION, EMULSION INTRAVENOUS at 10:06

## 2025-03-24 RX ADMIN — PROPOFOL 50 MG: 10 INJECTION, EMULSION INTRAVENOUS at 10:08

## 2025-03-24 RX ADMIN — SODIUM CHLORIDE, SODIUM LACTATE, POTASSIUM CHLORIDE, AND CALCIUM CHLORIDE: .6; .31; .03; .02 INJECTION, SOLUTION INTRAVENOUS at 09:27

## 2025-03-24 RX ADMIN — PROPOFOL 150 MG: 10 INJECTION, EMULSION INTRAVENOUS at 09:59

## 2025-03-24 NOTE — ANESTHESIA POSTPROCEDURE EVALUATION
Post-Op Assessment Note    CV Status:  Stable    Pain management: adequate       Mental Status:  Sleepy   Hydration Status:  Euvolemic   PONV Controlled:  Controlled   Airway Patency:  Patent     Post Op Vitals Reviewed: Yes    No anethesia notable event occurred.    Staff: CRNA           Last Filed PACU Vitals:  Vitals Value Taken Time   Temp 97.9    Pulse 88    /65    Resp 20    SpO2 99

## 2025-03-24 NOTE — INTERVAL H&P NOTE
H&P reviewed. After examining the patient I find no changes in the patients condition since the H&P had been written.    Vitals:    03/24/25 0935   BP: 117/65   Pulse: 61   Resp: 18   Temp: (!) 96.8 °F (36 °C)   SpO2: 97%

## 2025-03-24 NOTE — ANESTHESIA PREPROCEDURE EVALUATION
Procedure:  COLONOSCOPY  EGD    Relevant Problems   ANESTHESIA (within normal limits)      CARDIO (within normal limits)      ENDO (within normal limits)      PULMONARY (within normal limits)        Physical Exam    Airway    Mallampati score: II  TM Distance: >3 FB  Neck ROM: full     Dental   No notable dental hx     Cardiovascular  Rhythm: regular, Rate: normal    Pulmonary   Breath sounds clear to auscultation    Other Findings        Anesthesia Plan  ASA Score- 2     Anesthesia Type- IV sedation with anesthesia with ASA Monitors.         Additional Monitors:     Airway Plan:            Plan Factors-Exercise tolerance (METS): >4 METS.    Chart reviewed.   Existing labs reviewed. Patient summary reviewed.    Patient is not a current smoker.              Induction-     Postoperative Plan-         Informed Consent- Anesthetic plan and risks discussed with patient.  I personally reviewed this patient with the CRNA. Discussed and agreed on the Anesthesia Plan with the CRNA..      NPO Status:  Vitals Value Taken Time   Date of last liquid 03/24/25 03/24/25 0930   Time of last liquid 0300 03/24/25 0930   Date of last solid 03/20/25 03/24/25 0930   Time of last solid 1300 03/24/25 0930

## 2025-03-28 PROCEDURE — 88305 TISSUE EXAM BY PATHOLOGIST: CPT | Performed by: PATHOLOGY

## 2025-03-28 PROCEDURE — 88342 IMHCHEM/IMCYTCHM 1ST ANTB: CPT | Performed by: PATHOLOGY

## 2025-05-05 ENCOUNTER — RESULTS FOLLOW-UP (OUTPATIENT)
Age: 40
End: 2025-05-05

## 2025-05-05 NOTE — RESULT ENCOUNTER NOTE
Please call the patient regarding results.  Stomach biopsies show H. pylori infection.  Please treat with quadruple therapy as per protocol.  Colon polyp was a benign sessile serrated adenoma.  Repeat colonoscopy in 3 years

## 2025-05-06 NOTE — TELEPHONE ENCOUNTER
Second attempt to reach pt. Left message for patient to return call. Unable to Reach letter to be mailed.

## 2025-05-16 ENCOUNTER — OFFICE VISIT (OUTPATIENT)
Dept: URGENT CARE | Facility: CLINIC | Age: 40
End: 2025-05-16
Payer: COMMERCIAL

## 2025-05-16 VITALS
HEIGHT: 71 IN | HEART RATE: 61 BPM | OXYGEN SATURATION: 99 % | WEIGHT: 210 LBS | SYSTOLIC BLOOD PRESSURE: 124 MMHG | BODY MASS INDEX: 29.4 KG/M2 | TEMPERATURE: 97.8 F | DIASTOLIC BLOOD PRESSURE: 80 MMHG | RESPIRATION RATE: 20 BRPM

## 2025-05-16 DIAGNOSIS — R30.9 PAINFUL URINATION: Primary | ICD-10-CM

## 2025-05-16 LAB
SL AMB  POCT GLUCOSE, UA: ABNORMAL
SL AMB LEUKOCYTE ESTERASE,UA: ABNORMAL
SL AMB POCT BILIRUBIN,UA: ABNORMAL
SL AMB POCT BLOOD,UA: ABNORMAL
SL AMB POCT CLARITY,UA: CLEAR
SL AMB POCT COLOR,UA: YELLOW
SL AMB POCT KETONES,UA: ABNORMAL
SL AMB POCT NITRITE,UA: ABNORMAL
SL AMB POCT PH,UA: 7
SL AMB POCT SPECIFIC GRAVITY,UA: 1.01
SL AMB POCT URINE PROTEIN: ABNORMAL
SL AMB POCT UROBILINOGEN: 1

## 2025-05-16 PROCEDURE — 99213 OFFICE O/P EST LOW 20 MIN: CPT | Performed by: PHYSICIAN ASSISTANT

## 2025-05-16 PROCEDURE — 81002 URINALYSIS NONAUTO W/O SCOPE: CPT | Performed by: PHYSICIAN ASSISTANT

## 2025-05-16 RX ORDER — NITROFURANTOIN 25; 75 MG/1; MG/1
100 CAPSULE ORAL 2 TIMES DAILY
Qty: 14 CAPSULE | Refills: 0 | Status: SHIPPED | OUTPATIENT
Start: 2025-05-16 | End: 2025-05-23

## 2025-05-16 NOTE — PATIENT INSTRUCTIONS
"Dysuria:   -The patients hx and sx are most consistent with an acute uncomplicated UTI. Will treat with Macrobid 100mg taken as prescribed. According to his last urine culture in July 2024 his ESBL is susceptible to Macrobid.  Take with food and a probiotic. He has no fever, chills, body aches. No flank pain or CVA tenderness as per patient. No penile discharge or concern for STD.   -U/A showed leukocytes and nitrites and urine cx ordered today. Call in 48 hours for your results.   -Warm heating pad on the abdomen or sitz bath for comfort  -Avoid bubble bath/bath oils. Caffeine and alcohol may irritate the bladder.   -Empty bladder immediately before and following intercourse. Avoid \"holding urine.\"  -AZO/Uricalm for pain control, do not take for more than 48 hours as this can mask worsening symptoms.   -Stay VERY well hydrated and push fluids. You want your urine clear.   -Prevention and precautions discussed  -If your sx worsen or persists, see your PCP or Urologist immediately as discussed. Red flag signs discussed in depth.       "

## 2025-05-16 NOTE — PROGRESS NOTES
"  [facility]        NAME: Salvador Leung is a 39 y.o. male  : 1985    MRN: [id number]  DATE: May 16, 2025  TIME: 12:32 PM    Assessment and Plan   Painful urination [R30.9]  1. Painful urination  POCT urine dip    Urine culture            Patient Instructions   Dysuria:   -The patients hx and sx are most consistent with an acute uncomplicated UTI. Will treat with Macrobid 100mg taken as prescribed. According to his last urine culture in 2024 his ESBL is susceptible to Macrobid.  Take with food and a probiotic. He has no fever, chills, body aches. No flank pain or CVA tenderness as per patient. No penile discharge or concern for STD.   -U/A showed leukocytes and nitrites and urine cx ordered today. Call in 48 hours for your results.   -Warm heating pad on the abdomen or sitz bath for comfort  -Avoid bubble bath/bath oils. Caffeine and alcohol may irritate the bladder.   -Empty bladder immediately before and following intercourse. Avoid \"holding urine.\"  -AZO/Uricalm for pain control, do not take for more than 48 hours as this can mask worsening symptoms.   -Stay VERY well hydrated and push fluids. You want your urine clear.   -Prevention and precautions discussed  -If your sx worsen or persists, see your PCP or Urologist immediately as discussed. Red flag signs discussed in depth.         Follow up with PCP in 3-5 days.  Proceed to  ER if symptoms worsen.    If tests have been performed at [facility], our office will contact you with results if changes need to be made to the care plan discussed with you at the visit.  You can review your full results on [facility] MyChart.    Chief Complaint     Chief Complaint   Patient presents with    Difficulty Urinating     Pt here ill x3 days  pain when  voiding,  no concerns for an STD.  Pt state pressure  only.  Pt states this is the 3rd time this has happened.          History of Present Illness       Salvador presents today for a three day hx of " suprapubic pressure, urgency and frequency. He states that he has had three UTI's in the last year. He has no concern for STD at this time. He has no fever, chills or body aches. No nausea, vomiting or diarrhea. No abdominal pain or pelvic pain. No flank pain. No dizziness, weakness. No penile discharge. No scrotal or testicular swelling or pain. No penile tenderness. No lesions or rash. The patient has had ultrasounds and CT scans of his abdomen and pelvis which showed findings consistent with cystitis but no other concerns of the bladder or prostate at the time. His last urine culture that was positive for ESBL E.Coli and he was treated with Augmentin. The bacteria also showed susceptibly to Macrobid. He has no hx of nephrolithiasis, pyelonephritis. He is in a monogenous relationship.         Review of Systems   Review of Systems   Constitutional:  Negative for activity change, appetite change, chills, fatigue and fever.   Respiratory:  Negative for cough, chest tightness, shortness of breath and wheezing.    Cardiovascular:  Negative for chest pain and palpitations.   Gastrointestinal:  Negative for abdominal distention, abdominal pain, blood in stool, constipation, diarrhea, nausea and vomiting.   Genitourinary:  Positive for frequency and urgency. Negative for decreased urine volume, dysuria, flank pain, genital sores, hematuria, penile discharge, penile pain, penile swelling, scrotal swelling and testicular pain.   Musculoskeletal:  Negative for arthralgias and myalgias.   Skin:  Negative for rash.   Hematological:  Negative for adenopathy. Does not bruise/bleed easily.         Current Medications     Current Medications[1]    Current Allergies     Allergies as of 05/16/2025 - Reviewed 05/16/2025   Allergen Reaction Noted    Eggs or egg-derived products - food allergy Shortness Of Breath 05/29/2024            The following portions of the patient's history were reviewed and updated as appropriate: allergies,  "current medications, past family history, past medical history, past social history, past surgical history and problem list.     Past Medical History:   Diagnosis Date    Patient denies medical problems        Past Surgical History:   Procedure Laterality Date    KNEE SURGERY Right        Family History   Problem Relation Age of Onset    No Known Problems Mother     Clotting disorder Father     Bone cancer Paternal Grandfather          Medications have been verified.        Objective   /80 (Patient Position: Sitting, Cuff Size: Standard)   Pulse 61   Temp 97.8 °F (36.6 °C) (Tympanic)   Resp 20   Ht 5' 11\" (1.803 m)   Wt 95.3 kg (210 lb)   SpO2 99%   BMI 29.29 kg/m²   No LMP for male patient.       Physical Exam     Physical Exam  Vitals and nursing note reviewed.   Constitutional:       General: He is awake. He is not in acute distress.     Appearance: Normal appearance. He is well-developed and well-groomed. He is not ill-appearing, toxic-appearing or diaphoretic.     Cardiovascular:      Rate and Rhythm: Normal rate and regular rhythm. No extrasystoles are present.     Pulses: Normal pulses.      Heart sounds: Normal heart sounds, S1 normal and S2 normal. Heart sounds not distant. No murmur heard.  Pulmonary:      Effort: Pulmonary effort is normal.      Breath sounds: Normal breath sounds and air entry.   Abdominal:      General: Abdomen is flat. Bowel sounds are normal. There is no distension.      Palpations: Abdomen is soft. There is no shifting dullness, hepatomegaly, splenomegaly or pulsatile mass.      Tenderness: There is no abdominal tenderness. There is no right CVA tenderness, left CVA tenderness, guarding or rebound. Negative signs include Akhtar's sign and McBurney's sign.      Hernia: No hernia is present.   Genitourinary:     Comments: Deferred     Neurological:      Mental Status: He is alert and easily aroused.                        [1] No current outpatient medications on file.    "

## 2025-05-20 ENCOUNTER — RESULTS FOLLOW-UP (OUTPATIENT)
Dept: URGENT CARE | Facility: CLINIC | Age: 40
End: 2025-05-20

## 2025-05-20 LAB
BACTERIA UR CULT: ABNORMAL
Lab: ABNORMAL
SL AMB ANTIMICROBIAL SUSCEPTIBILITY: ABNORMAL

## 2025-06-06 ENCOUNTER — OFFICE VISIT (OUTPATIENT)
Dept: FAMILY MEDICINE CLINIC | Facility: CLINIC | Age: 40
End: 2025-06-06
Payer: COMMERCIAL

## 2025-06-06 VITALS
BODY MASS INDEX: 29.18 KG/M2 | OXYGEN SATURATION: 98 % | HEART RATE: 87 BPM | HEIGHT: 71 IN | SYSTOLIC BLOOD PRESSURE: 122 MMHG | DIASTOLIC BLOOD PRESSURE: 73 MMHG | WEIGHT: 208.4 LBS

## 2025-06-06 DIAGNOSIS — N30.90 CYSTITIS: ICD-10-CM

## 2025-06-06 DIAGNOSIS — R73.03 PREDIABETES: ICD-10-CM

## 2025-06-06 DIAGNOSIS — K05.10 BLISTER OF GINGIVA WITH INFECTION, INITIAL ENCOUNTER: Primary | ICD-10-CM

## 2025-06-06 DIAGNOSIS — R79.89 ELEVATED SERUM CREATININE: ICD-10-CM

## 2025-06-06 DIAGNOSIS — S00.522A BLISTER OF GINGIVA WITH INFECTION, INITIAL ENCOUNTER: Primary | ICD-10-CM

## 2025-06-06 DIAGNOSIS — A04.8 H. PYLORI INFECTION: ICD-10-CM

## 2025-06-06 DIAGNOSIS — E78.5 DYSLIPIDEMIA: ICD-10-CM

## 2025-06-06 PROCEDURE — 99214 OFFICE O/P EST MOD 30 MIN: CPT | Performed by: INTERNAL MEDICINE

## 2025-06-06 RX ORDER — IBUPROFEN 800 MG/1
800 TABLET, FILM COATED ORAL EVERY 6 HOURS PRN
COMMUNITY
Start: 2025-05-19

## 2025-06-06 RX ORDER — AMOXICILLIN 500 MG/1
500 CAPSULE ORAL 3 TIMES DAILY
COMMUNITY
Start: 2025-05-19 | End: 2025-06-06

## 2025-06-06 RX ORDER — AMOXICILLIN 500 MG/1
500 CAPSULE ORAL EVERY 8 HOURS SCHEDULED
Qty: 30 CAPSULE | Refills: 0 | Status: SHIPPED | OUTPATIENT
Start: 2025-06-06 | End: 2025-06-16

## 2025-06-06 NOTE — ASSESSMENT & PLAN NOTE
Patient with elevated cholesterol level at 242 last visit I recommended strict diet with regards to fatty foods fried foods get a repeat lipid profile done but not done yet again emphasized for getting the repeat blood test done we will order the same again.  Orders:  •  Lipid panel; Future

## 2025-06-06 NOTE — ASSESSMENT & PLAN NOTE
Patient had an EGD done and biopsies of the stomach revealed H. pylori infection gastroenterologist were trying to reach him but he was [place] recommend patient to call them back if this treatment started for the same.  Emphasized the importance.

## 2025-06-06 NOTE — ASSESSMENT & PLAN NOTE
Patient with discomfort pain in the lower right side, area recently received antibiotics from the dentist but still has some symptoms with pain discomfort exam shows some tenderness in the lower part on the right side with some swelling and tenderness will give another course of antibiotic but recommend patient to follow-up with the dentist also.  Orders:  •  amoxicillin (AMOXIL) 500 mg capsule; Take 1 capsule (500 mg total) by mouth every 8 (eight) hours for 10 days

## 2025-06-06 NOTE — ASSESSMENT & PLAN NOTE
Patient with 2 episodes of blood in the urine diagnosed with cystitis with ESBL received antibiotics for the same he had also had a CAT scan which had shown some abnormality in the bladder for which a cystoscopy was recommended but patient has not gone back for the same as he was [place] recommend very strongly to see the urologist and get the cystoscopy done.    3 weeks back he again had infection was seen in the walk-in clinic and was prescribed Macrobid and urine cultures still showing E. coli with ESBL emphasized the need for evaluation with the urologist by cystoscopy because of the recurrent nature of infection.  Recommend also patient drink more water

## 2025-06-06 NOTE — PROGRESS NOTES
Name: Salvador Leung      : 1985      MRN: [id number]  Encounter Provider: Jose Zhang MD  Encounter Date: 2025   [facility]  :  Assessment & Plan  Blister of gingiva with infection, initial encounter  Patient with discomfort pain in the lower right side, area recently received antibiotics from the dentist but still has some symptoms with pain discomfort exam shows some tenderness in the lower part on the right side with some swelling and tenderness will give another course of antibiotic but recommend patient to follow-up with the dentist also.  Orders:  •  amoxicillin (AMOXIL) 500 mg capsule; Take 1 capsule (500 mg total) by mouth every 8 (eight) hours for 10 days    Cystitis  Patient with 2 episodes of blood in the urine diagnosed with cystitis with ESBL received antibiotics for the same he had also had a CAT scan which had shown some abnormality in the bladder for which a cystoscopy was recommended but patient has not gone back for the same as [place] recommend very strongly to see the urologist and get the cystoscopy done.    3 weeks back he again had infection was seen in the walk-in clinic and was prescribed Macrobid and urine cultures still showing E. coli with ESBL emphasized the need for evaluation with the urologist by cystoscopy because of the recurrent nature of infection.  Recommend also patient drink more water       Dyslipidemia  Patient with elevated cholesterol level at 242 last visit I recommended strict diet with regards to fatty foods fried foods get a repeat lipid profile done but not done yet again emphasized for getting the repeat blood test done we will order the same again.  Orders:  •  Lipid panel; Future    Elevated serum creatinine  Last creatinine level was 1.18.  Follow-up with repeat 1       H. pylori infection  Patient had an EGD done and biopsies of the stomach revealed H. pylori infection gastroenterologist  were trying to reach him but he was out of [place] recommend patient to call them back if this treatment started for the same.  Emphasized the importance.       Prediabetes    Orders:  •  Comprehensive metabolic panel; Future           History of Present Illness   Patient is coming here for evaluation regarding symptoms of pain discomfort in the right lower gum area.  Recently he was seen by the dentist who has prescribed him amoxicillin but the symptoms are still present and was concerned about the same.    Patient was recently seen in the walk-in clinic with symptoms of urinary tract infection history of ESBL in the past with hematuria was seen by the urologist who wanted to do a cystoscopy but patient has missed the appointment as [place] was [place].  Recommend very strongly to call them back and get the cystoscopy done because of the recurrent infections.    Patient was also seen by the gastroenterologist and had an upper and lower endoscopy done because of the abdominal discomfort pain.  He had H. pylori positive they called and left a message but unfortunately was out of country again did not call them back.  Recommend patient to call gastroenterologist set up the appointment and also follow the instructions regarding the infection in the stomach.    Medication reviewed labs reviewed.      Review of Systems   Constitutional:  Negative for chills and fever.   HENT:  Negative for ear pain and sore throat.    Eyes:  Negative for pain and visual disturbance.   Respiratory:  Negative for cough and shortness of breath.    Cardiovascular:  Negative for chest pain and palpitations.   Gastrointestinal:  Negative for abdominal pain and vomiting.   Genitourinary:  Negative for dysuria and hematuria.   Musculoskeletal:  Negative for arthralgias and back pain.   Skin:  Negative for color change and rash.   Neurological:  Negative for seizures and syncope.   All other systems reviewed and are negative.      Objective  "  /73 (BP Location: Right arm, Patient Position: Sitting, Cuff Size: Large)   Pulse 87   Ht 5' 11\" (1.803 m)   Wt 94.5 kg (208 lb 6.4 oz)   SpO2 98%   BMI 29.07 kg/m²      Physical Exam  Vitals and nursing note reviewed.   Constitutional:       General: He is not in acute distress.     Appearance: He is well-developed.   HENT:      Head: Normocephalic and atraumatic.     Eyes:      Conjunctiva/sclera: Conjunctivae normal.       Cardiovascular:      Rate and Rhythm: Normal rate and regular rhythm.      Heart sounds: No murmur heard.  Pulmonary:      Effort: Pulmonary effort is normal. No respiratory distress.      Breath sounds: Normal breath sounds.   Abdominal:      Palpations: Abdomen is soft.      Tenderness: There is no abdominal tenderness.     Musculoskeletal:         General: No swelling.      Cervical back: Neck supple.     Skin:     General: Skin is warm and dry.      Capillary Refill: Capillary refill takes less than 2 seconds.     Neurological:      Mental Status: He is alert and oriented to person, place, and time.     Psychiatric:         Mood and Affect: Mood normal.         "